# Patient Record
Sex: MALE | Race: BLACK OR AFRICAN AMERICAN | URBAN - METROPOLITAN AREA
[De-identification: names, ages, dates, MRNs, and addresses within clinical notes are randomized per-mention and may not be internally consistent; named-entity substitution may affect disease eponyms.]

---

## 2017-02-06 PROBLEM — K37 APPENDICITIS: Status: ACTIVE | Noted: 2017-02-06

## 2018-01-01 ENCOUNTER — HOSPITAL ENCOUNTER (OUTPATIENT)
Dept: WOUND CARE | Age: 73
Discharge: HOME | End: 2018-08-06
Payer: MEDICARE

## 2018-01-01 ENCOUNTER — HOSPITAL ENCOUNTER (OUTPATIENT)
Dept: WOUND CARE | Age: 73
Discharge: HOME | End: 2018-06-25
Payer: MEDICARE

## 2018-01-01 ENCOUNTER — HOSPITAL ENCOUNTER (OUTPATIENT)
Dept: WOUND CARE | Age: 73
Discharge: HOME | End: 2018-08-13
Payer: MEDICARE

## 2018-01-01 ENCOUNTER — HOSPITAL ENCOUNTER (OUTPATIENT)
Dept: WOUND CARE | Age: 73
Discharge: HOME | End: 2018-10-01
Payer: MEDICARE

## 2018-01-01 ENCOUNTER — HOSPITAL ENCOUNTER (OUTPATIENT)
Dept: WOUND CARE | Age: 73
Discharge: HOME | End: 2018-08-20
Payer: MEDICARE

## 2018-01-01 ENCOUNTER — APPOINTMENT (OUTPATIENT)
Dept: GENERAL RADIOLOGY | Age: 73
DRG: 871 | End: 2018-01-01
Payer: MEDICARE

## 2018-01-01 ENCOUNTER — HOSPITAL ENCOUNTER (OUTPATIENT)
Dept: WOUND CARE | Age: 73
Discharge: HOME | End: 2018-09-24
Payer: MEDICARE

## 2018-01-01 ENCOUNTER — HOSPITAL ENCOUNTER (OUTPATIENT)
Dept: WOUND CARE | Age: 73
Discharge: HOME | End: 2018-09-17
Payer: MEDICARE

## 2018-01-01 ENCOUNTER — HOSPITAL ENCOUNTER (OUTPATIENT)
Age: 73
Discharge: HOME | End: 2018-09-09
Payer: MEDICARE

## 2018-01-01 ENCOUNTER — HOSPITAL ENCOUNTER (OUTPATIENT)
Dept: WOUND CARE | Age: 73
Discharge: HOME | End: 2018-11-05
Payer: MEDICARE

## 2018-01-01 ENCOUNTER — APPOINTMENT (OUTPATIENT)
Dept: ULTRASOUND IMAGING | Age: 73
DRG: 871 | End: 2018-01-01
Payer: MEDICARE

## 2018-01-01 ENCOUNTER — HOSPITAL ENCOUNTER (OUTPATIENT)
Dept: WOUND CARE | Age: 73
Discharge: HOME | End: 2018-09-10
Payer: MEDICARE

## 2018-01-01 ENCOUNTER — HOSPITAL ENCOUNTER (OUTPATIENT)
Dept: WOUND CARE | Age: 73
Discharge: HOME | End: 2018-10-29
Payer: MEDICARE

## 2018-01-01 ENCOUNTER — HOSPITAL ENCOUNTER (OUTPATIENT)
Dept: WOUND CARE | Age: 73
Discharge: HOME | End: 2018-10-22
Payer: MEDICARE

## 2018-01-01 ENCOUNTER — HOSPITAL ENCOUNTER (OUTPATIENT)
Dept: WOUND CARE | Age: 73
Discharge: HOME | End: 2018-08-27
Payer: MEDICARE

## 2018-01-01 ENCOUNTER — HOSPITAL ENCOUNTER (OUTPATIENT)
Dept: WOUND CARE | Age: 73
Discharge: HOME | End: 2018-06-18
Payer: MEDICARE

## 2018-01-01 ENCOUNTER — HOSPITAL ENCOUNTER (OUTPATIENT)
Dept: WOUND CARE | Age: 73
Discharge: HOME | End: 2018-10-15
Payer: MEDICARE

## 2018-01-01 ENCOUNTER — HOSPITAL ENCOUNTER (OUTPATIENT)
Dept: INTERVENTIONAL RADIOLOGY/VASCULAR | Age: 73
Discharge: HOME | End: 2018-06-27
Payer: MEDICARE

## 2018-01-01 ENCOUNTER — HOSPITAL ENCOUNTER (OUTPATIENT)
Dept: WOUND CARE | Age: 73
Discharge: HOME | End: 2018-07-11
Payer: MEDICARE

## 2018-01-01 ENCOUNTER — HOSPITAL ENCOUNTER (OUTPATIENT)
Dept: WOUND CARE | Age: 73
Discharge: HOME | End: 2018-10-08
Payer: MEDICARE

## 2018-01-01 ENCOUNTER — HOSPITAL ENCOUNTER (OUTPATIENT)
Dept: WOUND CARE | Age: 73
Discharge: HOME | End: 2018-07-16
Payer: MEDICARE

## 2018-01-01 ENCOUNTER — HOSPITAL ENCOUNTER (OUTPATIENT)
Age: 73
Discharge: HOME | End: 2018-06-03
Payer: MEDICARE

## 2018-01-01 ENCOUNTER — HOSPITAL ENCOUNTER (OUTPATIENT)
Dept: WOUND CARE | Age: 73
Discharge: HOME | End: 2018-09-04
Payer: MEDICARE

## 2018-01-01 ENCOUNTER — HOSPITAL ENCOUNTER (OUTPATIENT)
Dept: WOUND CARE | Age: 73
Discharge: HOME | End: 2018-06-11
Payer: MEDICARE

## 2018-01-01 ENCOUNTER — HOSPITAL ENCOUNTER (OUTPATIENT)
Dept: WOUND CARE | Age: 73
Discharge: HOME | End: 2018-07-30
Payer: MEDICARE

## 2018-01-01 ENCOUNTER — HOSPITAL ENCOUNTER (OUTPATIENT)
Dept: WOUND CARE | Age: 73
Discharge: HOME | End: 2018-07-23
Payer: MEDICARE

## 2018-01-01 ENCOUNTER — HOSPITAL ENCOUNTER (INPATIENT)
Age: 73
LOS: 1 days | DRG: 871 | End: 2018-11-10
Attending: EMERGENCY MEDICINE | Admitting: INTERNAL MEDICINE
Payer: MEDICARE

## 2018-01-01 VITALS
SYSTOLIC BLOOD PRESSURE: 136 MMHG | HEART RATE: 84 BPM | WEIGHT: 315 LBS | DIASTOLIC BLOOD PRESSURE: 84 MMHG | HEIGHT: 66 IN | BODY MASS INDEX: 50.62 KG/M2 | TEMPERATURE: 99.2 F | RESPIRATION RATE: 18 BRPM

## 2018-01-01 VITALS
RESPIRATION RATE: 16 BRPM | HEART RATE: 68 BPM | TEMPERATURE: 98.3 F | SYSTOLIC BLOOD PRESSURE: 140 MMHG | BODY MASS INDEX: 50.62 KG/M2 | HEIGHT: 66 IN | WEIGHT: 315 LBS | DIASTOLIC BLOOD PRESSURE: 70 MMHG

## 2018-01-01 VITALS
DIASTOLIC BLOOD PRESSURE: 78 MMHG | HEART RATE: 76 BPM | HEIGHT: 66 IN | RESPIRATION RATE: 18 BRPM | TEMPERATURE: 98.4 F | WEIGHT: 286 LBS | SYSTOLIC BLOOD PRESSURE: 152 MMHG | BODY MASS INDEX: 45.96 KG/M2

## 2018-01-01 VITALS
HEIGHT: 66 IN | BODY MASS INDEX: 50.62 KG/M2 | RESPIRATION RATE: 16 BRPM | WEIGHT: 315 LBS | DIASTOLIC BLOOD PRESSURE: 80 MMHG | TEMPERATURE: 98 F | SYSTOLIC BLOOD PRESSURE: 130 MMHG | HEART RATE: 88 BPM

## 2018-01-01 VITALS
RESPIRATION RATE: 16 BRPM | HEART RATE: 64 BPM | WEIGHT: 286 LBS | DIASTOLIC BLOOD PRESSURE: 60 MMHG | SYSTOLIC BLOOD PRESSURE: 134 MMHG | HEIGHT: 66 IN | TEMPERATURE: 98 F | BODY MASS INDEX: 45.96 KG/M2

## 2018-01-01 VITALS
SYSTOLIC BLOOD PRESSURE: 110 MMHG | RESPIRATION RATE: 20 BRPM | HEART RATE: 82 BPM | WEIGHT: 286 LBS | HEIGHT: 66 IN | TEMPERATURE: 98.3 F | BODY MASS INDEX: 45.96 KG/M2 | DIASTOLIC BLOOD PRESSURE: 60 MMHG

## 2018-01-01 VITALS
TEMPERATURE: 98 F | DIASTOLIC BLOOD PRESSURE: 84 MMHG | HEART RATE: 88 BPM | WEIGHT: 286 LBS | SYSTOLIC BLOOD PRESSURE: 136 MMHG | HEIGHT: 66 IN | BODY MASS INDEX: 45.96 KG/M2 | RESPIRATION RATE: 18 BRPM

## 2018-01-01 VITALS
SYSTOLIC BLOOD PRESSURE: 140 MMHG | BODY MASS INDEX: 50.62 KG/M2 | HEIGHT: 66 IN | HEART RATE: 88 BPM | DIASTOLIC BLOOD PRESSURE: 80 MMHG | WEIGHT: 315 LBS | RESPIRATION RATE: 20 BRPM | TEMPERATURE: 98 F

## 2018-01-01 VITALS
HEIGHT: 66 IN | BODY MASS INDEX: 50.62 KG/M2 | DIASTOLIC BLOOD PRESSURE: 80 MMHG | RESPIRATION RATE: 16 BRPM | HEART RATE: 72 BPM | TEMPERATURE: 98.6 F | WEIGHT: 315 LBS | SYSTOLIC BLOOD PRESSURE: 146 MMHG

## 2018-01-01 VITALS
OXYGEN SATURATION: 81 % | DIASTOLIC BLOOD PRESSURE: 79 MMHG | HEART RATE: 56 BPM | BODY MASS INDEX: 48.23 KG/M2 | RESPIRATION RATE: 23 BRPM | SYSTOLIC BLOOD PRESSURE: 104 MMHG | HEIGHT: 66 IN | TEMPERATURE: 99.4 F | WEIGHT: 300.1 LBS

## 2018-01-01 VITALS
BODY MASS INDEX: 45.96 KG/M2 | DIASTOLIC BLOOD PRESSURE: 74 MMHG | TEMPERATURE: 98.8 F | HEART RATE: 74 BPM | SYSTOLIC BLOOD PRESSURE: 128 MMHG | RESPIRATION RATE: 18 BRPM | WEIGHT: 286 LBS | HEIGHT: 66 IN

## 2018-01-01 VITALS
RESPIRATION RATE: 16 BRPM | HEIGHT: 66 IN | DIASTOLIC BLOOD PRESSURE: 70 MMHG | WEIGHT: 315 LBS | BODY MASS INDEX: 50.62 KG/M2 | TEMPERATURE: 97.8 F | HEART RATE: 88 BPM | SYSTOLIC BLOOD PRESSURE: 123 MMHG

## 2018-01-01 VITALS
HEART RATE: 72 BPM | BODY MASS INDEX: 45.96 KG/M2 | HEIGHT: 66 IN | HEIGHT: 66 IN | WEIGHT: 286 LBS | RESPIRATION RATE: 18 BRPM | DIASTOLIC BLOOD PRESSURE: 78 MMHG | TEMPERATURE: 99.4 F | BODY MASS INDEX: 45.96 KG/M2 | TEMPERATURE: 99 F | HEART RATE: 80 BPM | SYSTOLIC BLOOD PRESSURE: 138 MMHG | WEIGHT: 286 LBS | RESPIRATION RATE: 18 BRPM | SYSTOLIC BLOOD PRESSURE: 142 MMHG | DIASTOLIC BLOOD PRESSURE: 70 MMHG

## 2018-01-01 VITALS
RESPIRATION RATE: 18 BRPM | HEART RATE: 76 BPM | BODY MASS INDEX: 45.96 KG/M2 | WEIGHT: 286 LBS | TEMPERATURE: 98.1 F | HEIGHT: 66 IN

## 2018-01-01 VITALS
SYSTOLIC BLOOD PRESSURE: 138 MMHG | BODY MASS INDEX: 45.96 KG/M2 | DIASTOLIC BLOOD PRESSURE: 78 MMHG | HEIGHT: 66 IN | TEMPERATURE: 97.8 F | RESPIRATION RATE: 20 BRPM | HEART RATE: 84 BPM | WEIGHT: 286 LBS

## 2018-01-01 VITALS
BODY MASS INDEX: 50.62 KG/M2 | DIASTOLIC BLOOD PRESSURE: 84 MMHG | SYSTOLIC BLOOD PRESSURE: 142 MMHG | SYSTOLIC BLOOD PRESSURE: 152 MMHG | HEART RATE: 88 BPM | TEMPERATURE: 97.4 F | HEIGHT: 66 IN | HEIGHT: 66 IN | WEIGHT: 315 LBS | TEMPERATURE: 98 F | RESPIRATION RATE: 20 BRPM | RESPIRATION RATE: 18 BRPM | HEART RATE: 76 BPM | DIASTOLIC BLOOD PRESSURE: 78 MMHG | BODY MASS INDEX: 45.96 KG/M2 | WEIGHT: 286 LBS

## 2018-01-01 VITALS
BODY MASS INDEX: 50.62 KG/M2 | DIASTOLIC BLOOD PRESSURE: 50 MMHG | HEIGHT: 66 IN | TEMPERATURE: 98.9 F | WEIGHT: 315 LBS | RESPIRATION RATE: 26 BRPM | SYSTOLIC BLOOD PRESSURE: 100 MMHG | HEART RATE: 76 BPM

## 2018-01-01 VITALS
TEMPERATURE: 98.7 F | RESPIRATION RATE: 20 BRPM | WEIGHT: 286 LBS | BODY MASS INDEX: 45.96 KG/M2 | SYSTOLIC BLOOD PRESSURE: 140 MMHG | HEIGHT: 66 IN | HEART RATE: 80 BPM | DIASTOLIC BLOOD PRESSURE: 70 MMHG

## 2018-01-01 VITALS
RESPIRATION RATE: 18 BRPM | HEIGHT: 66 IN | HEART RATE: 78 BPM | BODY MASS INDEX: 50.62 KG/M2 | TEMPERATURE: 98.9 F | WEIGHT: 315 LBS

## 2018-01-01 VITALS
DIASTOLIC BLOOD PRESSURE: 76 MMHG | SYSTOLIC BLOOD PRESSURE: 146 MMHG | TEMPERATURE: 97.9 F | RESPIRATION RATE: 18 BRPM | HEART RATE: 72 BPM | WEIGHT: 286 LBS | BODY MASS INDEX: 46.16 KG/M2

## 2018-01-01 DIAGNOSIS — L97.222 VENOUS STASIS ULCER OF LEFT CALF WITH FAT LAYER EXPOSED WITHOUT VARICOSE VEINS (HCC): ICD-10-CM

## 2018-01-01 DIAGNOSIS — I87.2 VENOUS STASIS ULCER OF LEFT CALF WITH FAT LAYER EXPOSED WITHOUT VARICOSE VEINS (HCC): ICD-10-CM

## 2018-01-01 DIAGNOSIS — R09.89 DECREASED DORSALIS PEDIS PULSE: ICD-10-CM

## 2018-01-01 DIAGNOSIS — E78.01 FAMILIAL HYPERCHOLESTEROLEMIA: ICD-10-CM

## 2018-01-01 DIAGNOSIS — E11.9 TYPE 2 DIABETES MELLITUS WITHOUT COMPLICATION, WITHOUT LONG-TERM CURRENT USE OF INSULIN (HCC): ICD-10-CM

## 2018-01-01 DIAGNOSIS — L97.222 VENOUS STASIS ULCER OF LEFT CALF WITH FAT LAYER EXPOSED WITHOUT VARICOSE VEINS (HCC): Chronic | ICD-10-CM

## 2018-01-01 DIAGNOSIS — I87.2 VENOUS STASIS ULCER OF LEFT CALF WITH FAT LAYER EXPOSED WITHOUT VARICOSE VEINS (HCC): Chronic | ICD-10-CM

## 2018-01-01 DIAGNOSIS — E87.20 METABOLIC ACIDOSIS: ICD-10-CM

## 2018-01-01 DIAGNOSIS — L97.922 ULCER OF LEFT LOWER EXTREMITY WITH FAT LAYER EXPOSED (HCC): ICD-10-CM

## 2018-01-01 DIAGNOSIS — L97.922 ULCER OF LEFT LOWER LEG, WITH FAT LAYER EXPOSED (HCC): Primary | ICD-10-CM

## 2018-01-01 DIAGNOSIS — J18.9 PNEUMONIA DUE TO ORGANISM: Primary | ICD-10-CM

## 2018-01-01 DIAGNOSIS — L97.922 ULCER OF LEFT LOWER LEG, WITH FAT LAYER EXPOSED (HCC): ICD-10-CM

## 2018-01-01 DIAGNOSIS — R06.03 RESPIRATORY DISTRESS: ICD-10-CM

## 2018-01-01 LAB
AADO2: 120.7 MMHG
AADO2: 187.8 MMHG
ALBUMIN SERPL-MCNC: 2.7 G/DL (ref 3.5–5.2)
ALBUMIN SERPL-MCNC: 3.5 G/DL (ref 3.5–5.2)
ALBUMIN SERPL-MCNC: 3.9 G/DL (ref 3.5–5.2)
ALBUMIN SERPL-MCNC: 3.9 G/DL (ref 3.5–5.2)
ALP BLD-CCNC: 50 U/L (ref 40–129)
ALP BLD-CCNC: 62 U/L (ref 40–129)
ALP BLD-CCNC: 83 U/L (ref 40–129)
ALP BLD-CCNC: 89 U/L (ref 40–129)
ALT SERPL-CCNC: 20 U/L (ref 0–40)
ALT SERPL-CCNC: 70 U/L (ref 0–40)
ALT SERPL-CCNC: 77 U/L (ref 0–40)
ALT SERPL-CCNC: 8 U/L (ref 0–40)
ANAEROBIC CULTURE: ABNORMAL
ANAEROBIC CULTURE: ABNORMAL
ANION GAP SERPL CALCULATED.3IONS-SCNC: 17 MMOL/L (ref 7–16)
ANION GAP SERPL CALCULATED.3IONS-SCNC: 19 MMOL/L (ref 7–16)
ANION GAP SERPL CALCULATED.3IONS-SCNC: 20 MMOL/L (ref 7–16)
ANION GAP SERPL CALCULATED.3IONS-SCNC: 20 MMOL/L (ref 7–16)
ANION GAP SERPL CALCULATED.3IONS-SCNC: 22 MMOL/L (ref 7–16)
ANION GAP SERPL CALCULATED.3IONS-SCNC: 28 MMOL/L (ref 7–16)
ANISOCYTOSIS: ABNORMAL
AST SERPL-CCNC: 137 U/L (ref 0–39)
AST SERPL-CCNC: 14 U/L (ref 0–39)
AST SERPL-CCNC: 241 U/L (ref 0–39)
AST SERPL-CCNC: 26 U/L (ref 0–39)
B.E.: -12.5 MMOL/L (ref -3–3)
B.E.: -17.3 MMOL/L (ref -3–3)
B.E.: -21.4 MMOL/L (ref -3–3)
B.E.: -6.6 MMOL/L (ref -3–3)
B.E.: -9 MMOL/L (ref -3–3)
BASOPHILS ABSOLUTE: 0 E9/L (ref 0–0.2)
BASOPHILS ABSOLUTE: 0.06 E9/L (ref 0–0.2)
BASOPHILS ABSOLUTE: 0.06 E9/L (ref 0–0.2)
BASOPHILS RELATIVE PERCENT: 0.3 % (ref 0–2)
BASOPHILS RELATIVE PERCENT: 1.2 % (ref 0–2)
BASOPHILS RELATIVE PERCENT: 1.3 % (ref 0–2)
BILIRUB SERPL-MCNC: 0.5 MG/DL (ref 0–1.2)
BILIRUB SERPL-MCNC: 0.7 MG/DL (ref 0–1.2)
BILIRUB SERPL-MCNC: 2.2 MG/DL (ref 0–1.2)
BILIRUB SERPL-MCNC: 2.9 MG/DL (ref 0–1.2)
BUN BLDV-MCNC: 19 MG/DL (ref 8–23)
BUN BLDV-MCNC: 20 MG/DL (ref 8–23)
BUN BLDV-MCNC: 57 MG/DL (ref 8–23)
BUN BLDV-MCNC: 65 MG/DL (ref 8–23)
BUN BLDV-MCNC: 70 MG/DL (ref 8–23)
BUN BLDV-MCNC: 73 MG/DL (ref 8–23)
BURR CELLS: ABNORMAL
C-REACTIVE PROTEIN: 0.5 MG/DL (ref 0–0.4)
CALCIUM SERPL-MCNC: 8.6 MG/DL (ref 8.6–10.2)
CALCIUM SERPL-MCNC: 9 MG/DL (ref 8.6–10.2)
CALCIUM SERPL-MCNC: 9.2 MG/DL (ref 8.6–10.2)
CALCIUM SERPL-MCNC: 9.6 MG/DL (ref 8.6–10.2)
CALCIUM SERPL-MCNC: 9.6 MG/DL (ref 8.6–10.2)
CALCIUM SERPL-MCNC: 9.7 MG/DL (ref 8.6–10.2)
CHLORIDE BLD-SCNC: 101 MMOL/L (ref 98–107)
CHLORIDE BLD-SCNC: 102 MMOL/L (ref 98–107)
CHLORIDE BLD-SCNC: 102 MMOL/L (ref 98–107)
CHLORIDE BLD-SCNC: 105 MMOL/L (ref 98–107)
CHLORIDE BLD-SCNC: 105 MMOL/L (ref 98–107)
CHLORIDE BLD-SCNC: 98 MMOL/L (ref 98–107)
CHOLESTEROL, TOTAL: 161 MG/DL (ref 0–199)
CHOLESTEROL, TOTAL: 183 MG/DL (ref 0–199)
CO2: 12 MMOL/L (ref 22–29)
CO2: 14 MMOL/L (ref 22–29)
CO2: 15 MMOL/L (ref 22–29)
CO2: 18 MMOL/L (ref 22–29)
CO2: 21 MMOL/L (ref 22–29)
CO2: 22 MMOL/L (ref 22–29)
COHB: 0.1 % (ref 0–1.5)
COHB: 0.1 % (ref 0–1.5)
COHB: 0.3 % (ref 0–1.5)
COMMENT: ABNORMAL
CREAT SERPL-MCNC: 1.7 MG/DL (ref 0.7–1.2)
CREAT SERPL-MCNC: 1.9 MG/DL (ref 0.7–1.2)
CREAT SERPL-MCNC: 3.3 MG/DL (ref 0.7–1.2)
CREAT SERPL-MCNC: 3.4 MG/DL (ref 0.7–1.2)
CREAT SERPL-MCNC: 3.6 MG/DL (ref 0.7–1.2)
CREAT SERPL-MCNC: 3.6 MG/DL (ref 0.7–1.2)
CREATININE URINE: 138 MG/DL (ref 40–278)
CRITICAL: ABNORMAL
DATE ANALYZED: ABNORMAL
DATE OF COLLECTION: ABNORMAL
EOSINOPHILS ABSOLUTE: 0 E9/L (ref 0.05–0.5)
EOSINOPHILS ABSOLUTE: 0.11 E9/L (ref 0.05–0.5)
EOSINOPHILS ABSOLUTE: 0.16 E9/L (ref 0.05–0.5)
EOSINOPHILS RELATIVE PERCENT: 0 % (ref 0–6)
EOSINOPHILS RELATIVE PERCENT: 2.2 % (ref 0–6)
EOSINOPHILS RELATIVE PERCENT: 3.5 % (ref 0–6)
FILM ARRAY ADENOVIRUS: NORMAL
FILM ARRAY BORDETELLA PERTUSSIS: NORMAL
FILM ARRAY CHLAMYDOPHILIA PNEUMONIAE: NORMAL
FILM ARRAY CORONAVIRUS 229E: NORMAL
FILM ARRAY CORONAVIRUS HKU1: NORMAL
FILM ARRAY CORONAVIRUS NL63: NORMAL
FILM ARRAY CORONAVIRUS OC43: NORMAL
FILM ARRAY INFLUENZA A VIRUS 09H1: NORMAL
FILM ARRAY INFLUENZA A VIRUS H1: NORMAL
FILM ARRAY INFLUENZA A VIRUS H3: NORMAL
FILM ARRAY INFLUENZA A VIRUS: NORMAL
FILM ARRAY INFLUENZA B: NORMAL
FILM ARRAY METAPNEUMOVIRUS: NORMAL
FILM ARRAY MYCOPLASMA PNEUMONIAE: NORMAL
FILM ARRAY PARAINFLUENZA VIRUS 1: NORMAL
FILM ARRAY PARAINFLUENZA VIRUS 2: NORMAL
FILM ARRAY PARAINFLUENZA VIRUS 3: NORMAL
FILM ARRAY PARAINFLUENZA VIRUS 4: NORMAL
FILM ARRAY RESPIRATORY SYNCITIAL VIRUS: NORMAL
FILM ARRAY RHINOVIRUS/ENTEROVIRUS: NORMAL
FIO2: 100 %
FIO2: 50 %
GFR AFRICAN AMERICAN: 20
GFR AFRICAN AMERICAN: 20
GFR AFRICAN AMERICAN: 22
GFR AFRICAN AMERICAN: 22
GFR AFRICAN AMERICAN: 42
GFR AFRICAN AMERICAN: 48
GFR NON-AFRICAN AMERICAN: 20 ML/MIN/1.73
GFR NON-AFRICAN AMERICAN: 20 ML/MIN/1.73
GFR NON-AFRICAN AMERICAN: 22 ML/MIN/1.73
GFR NON-AFRICAN AMERICAN: 22 ML/MIN/1.73
GFR NON-AFRICAN AMERICAN: 42 ML/MIN/1.73
GFR NON-AFRICAN AMERICAN: 48 ML/MIN/1.73
GLUCOSE BLD-MCNC: 112 MG/DL (ref 74–109)
GLUCOSE BLD-MCNC: 147 MG/DL (ref 74–99)
GLUCOSE BLD-MCNC: 151 MG/DL (ref 74–99)
GLUCOSE BLD-MCNC: 152 MG/DL (ref 74–99)
GLUCOSE BLD-MCNC: 54 MG/DL (ref 74–99)
GLUCOSE BLD-MCNC: 92 MG/DL (ref 74–109)
HBA1C MFR BLD: 5.8 % (ref 4–5.6)
HBA1C MFR BLD: 6 % (ref 4.8–5.9)
HCO3: 10.7 MMOL/L (ref 22–26)
HCO3: 13.7 MMOL/L (ref 22–26)
HCO3: 14.2 MMOL/L (ref 22–26)
HCO3: 17.6 MMOL/L (ref 22–26)
HCO3: 8.3 MMOL/L (ref 22–26)
HCT VFR BLD CALC: 35.3 % (ref 37–54)
HCT VFR BLD CALC: 40.8 % (ref 37–54)
HCT VFR BLD CALC: 41 % (ref 37–54)
HCT VFR BLD CALC: 43 % (ref 37–54)
HDLC SERPL-MCNC: 47 MG/DL
HDLC SERPL-MCNC: 51 MG/DL
HEMOGLOBIN: 11.1 G/DL (ref 12.5–16.5)
HEMOGLOBIN: 12.3 G/DL (ref 12.5–16.5)
HEMOGLOBIN: 12.4 G/DL (ref 12.5–16.5)
HEMOGLOBIN: 13.5 G/DL (ref 12.5–16.5)
HHB: 0.8 % (ref 0–5)
HHB: 0.9 % (ref 0–5)
HHB: 1.5 % (ref 0–5)
HHB: 6.4 % (ref 0–5)
HHB: 72.2 % (ref 0–5)
IMMATURE GRANULOCYTES #: 0.01 E9/L
IMMATURE GRANULOCYTES #: 0.02 E9/L
IMMATURE GRANULOCYTES %: 0.2 % (ref 0–5)
IMMATURE GRANULOCYTES %: 0.4 % (ref 0–5)
INR BLD: 6.7
INR BLD: 9.6
L. PNEUMOPHILA SEROGP 1 UR AG: NORMAL
LAB: 9558
LAB: ABNORMAL
LAB: ABNORMAL
LACTIC ACID: 2.7 MMOL/L (ref 0.5–2.2)
LACTIC ACID: 3 MMOL/L (ref 0.5–2.2)
LACTIC ACID: 3.5 MMOL/L (ref 0.5–2.2)
LACTIC ACID: 3.7 MMOL/L (ref 0.5–2.2)
LACTIC ACID: 3.9 MMOL/L (ref 0.5–2.2)
LACTIC ACID: 5.4 MMOL/L (ref 0.5–2.2)
LDL CHOLESTEROL CALCULATED: 112 MG/DL (ref 0–99)
LDL CHOLESTEROL CALCULATED: 98 MG/DL (ref 0–99)
LYMPHOCYTES ABSOLUTE: 0.36 E9/L (ref 1.5–4)
LYMPHOCYTES ABSOLUTE: 1.02 E9/L (ref 1.5–4)
LYMPHOCYTES ABSOLUTE: 1.18 E9/L (ref 1.5–4)
LYMPHOCYTES RELATIVE PERCENT: 1.7 % (ref 20–42)
LYMPHOCYTES RELATIVE PERCENT: 20 % (ref 20–42)
LYMPHOCYTES RELATIVE PERCENT: 25.8 % (ref 20–42)
Lab: ABNORMAL
MCH RBC QN AUTO: 29.6 PG (ref 26–35)
MCH RBC QN AUTO: 29.7 PG (ref 26–35)
MCH RBC QN AUTO: 30.8 PG (ref 26–35)
MCH RBC QN AUTO: 31.1 PG (ref 26–35)
MCHC RBC AUTO-ENTMCNC: 30.1 % (ref 32–34.5)
MCHC RBC AUTO-ENTMCNC: 30.2 % (ref 32–34.5)
MCHC RBC AUTO-ENTMCNC: 31.4 % (ref 32–34.5)
MCHC RBC AUTO-ENTMCNC: 31.4 % (ref 32–34.5)
MCV RBC AUTO: 102.8 FL (ref 80–99.9)
MCV RBC AUTO: 94.1 FL (ref 80–99.9)
MCV RBC AUTO: 97.9 FL (ref 80–99.9)
MCV RBC AUTO: 98.6 FL (ref 80–99.9)
METER GLUCOSE: 115 MG/DL (ref 74–99)
METER GLUCOSE: 116 MG/DL (ref 74–99)
METER GLUCOSE: 123 MG/DL (ref 74–99)
METER GLUCOSE: 127 MG/DL (ref 74–99)
METER GLUCOSE: 133 MG/DL (ref 74–99)
METER GLUCOSE: 154 MG/DL (ref 74–99)
METHB: 0.2 % (ref 0–1.5)
METHB: 0.4 % (ref 0–1.5)
METHB: 0.4 % (ref 0–1.5)
METHB: 1.1 % (ref 0–1.5)
METHB: 1.4 % (ref 0–1.5)
MODE: ABNORMAL
MONOCYTES ABSOLUTE: 0.47 E9/L (ref 0.1–0.95)
MONOCYTES ABSOLUTE: 0.51 E9/L (ref 0.1–0.95)
MONOCYTES ABSOLUTE: 0.53 E9/L (ref 0.1–0.95)
MONOCYTES RELATIVE PERCENT: 10 % (ref 2–12)
MONOCYTES RELATIVE PERCENT: 10.3 % (ref 2–12)
MONOCYTES RELATIVE PERCENT: 2.6 % (ref 2–12)
NEUTROPHILS ABSOLUTE: 17.09 E9/L (ref 1.8–7.3)
NEUTROPHILS ABSOLUTE: 2.69 E9/L (ref 1.8–7.3)
NEUTROPHILS ABSOLUTE: 3.39 E9/L (ref 1.8–7.3)
NEUTROPHILS RELATIVE PERCENT: 58.7 % (ref 43–80)
NEUTROPHILS RELATIVE PERCENT: 66.4 % (ref 43–80)
NEUTROPHILS RELATIVE PERCENT: 95.7 % (ref 43–80)
NUCLEATED RED BLOOD CELLS: 1.7 /100 WBC
O2 CONTENT: 16.7 ML/DL
O2 CONTENT: 16.7 ML/DL
O2 CONTENT: 18 ML/DL
O2 CONTENT: 19.4 ML/DL
O2 CONTENT: 4.7 ML/DL
O2 SATURATION: 26.8 % (ref 92–98.5)
O2 SATURATION: 93.6 % (ref 92–98.5)
O2 SATURATION: 98.5 % (ref 92–98.5)
O2 SATURATION: 99.1 % (ref 92–98.5)
O2 SATURATION: 99.2 % (ref 92–98.5)
O2HB: 26.4 % (ref 94–97)
O2HB: 92.9 % (ref 94–97)
O2HB: 97.5 % (ref 94–97)
O2HB: 98.2 % (ref 94–97)
O2HB: 98.6 % (ref 94–97)
OPERATOR ID: 2860
OPERATOR ID: 467
OPERATOR ID: ABNORMAL
ORGANISM: ABNORMAL
OVALOCYTES: ABNORMAL
PATIENT TEMP: 37 C
PCO2: 19.6 MMHG (ref 35–45)
PCO2: 24.1 MMHG (ref 35–45)
PCO2: 31.1 MMHG (ref 35–45)
PCO2: 32.1 MMHG (ref 35–45)
PCO2: 54.8 MMHG (ref 35–45)
PDW BLD-RTO: 15 FL (ref 11.5–15)
PDW BLD-RTO: 16.4 FL (ref 11.5–15)
PDW BLD-RTO: 16.7 FL (ref 11.5–15)
PDW BLD-RTO: 17 FL (ref 11.5–15)
PEEP/CPAP: 6 CMH2O
PFO2: 3.92 MMHG/%
PFO2: 4.81 MMHG/%
PH BLOOD GAS: 7.01 (ref 7.35–7.45)
PH BLOOD GAS: 7.03 (ref 7.35–7.45)
PH BLOOD GAS: 7.35 (ref 7.35–7.45)
PH BLOOD GAS: 7.37 (ref 7.35–7.45)
PH BLOOD GAS: 7.39 (ref 7.35–7.45)
PLATELET # BLD: 122 E9/L (ref 130–450)
PLATELET # BLD: 149 E9/L (ref 130–450)
PLATELET # BLD: 167 E9/L (ref 130–450)
PLATELET # BLD: 185 E9/L (ref 130–450)
PMV BLD AUTO: 10.9 FL (ref 7–12)
PMV BLD AUTO: 11.3 FL (ref 7–12)
PMV BLD AUTO: 12.2 FL (ref 7–12)
PMV BLD AUTO: 12.5 FL (ref 7–12)
PO2: 100.1 MMHG (ref 60–100)
PO2: 133.2 MMHG (ref 60–100)
PO2: 196.2 MMHG (ref 60–100)
PO2: 28 MMHG (ref 60–100)
PO2: 480.6 MMHG (ref 60–100)
POIKILOCYTES: ABNORMAL
POTASSIUM SERPL-SCNC: 4.4 MMOL/L (ref 3.5–5)
POTASSIUM SERPL-SCNC: 4.5 MMOL/L (ref 3.5–5)
POTASSIUM SERPL-SCNC: 4.6 MMOL/L (ref 3.5–5)
POTASSIUM SERPL-SCNC: 4.7 MMOL/L (ref 3.5–5)
POTASSIUM SERPL-SCNC: 4.7 MMOL/L (ref 3.5–5)
POTASSIUM SERPL-SCNC: 4.93 MMOL/L (ref 3.3–5.1)
POTASSIUM SERPL-SCNC: 5.4 MMOL/L (ref 3.5–5)
POTASSIUM SERPL-SCNC: 6.35 MMOL/L (ref 3.3–5.1)
POTASSIUM SERPL-SCNC: 6.54 MMOL/L (ref 3.3–5.1)
PRO-BNP: ABNORMAL PG/ML (ref 0–450)
PROCALCITONIN: 27.39 NG/ML (ref 0–0.08)
PROTHROMBIN TIME: 107 SEC (ref 9.3–12.4)
PROTHROMBIN TIME: 74.8 SEC (ref 9.3–12.4)
RBC # BLD: 3.75 E12/L (ref 3.8–5.8)
RBC # BLD: 3.99 E12/L (ref 3.8–5.8)
RBC # BLD: 4.14 E12/L (ref 3.8–5.8)
RBC # BLD: 4.39 E12/L (ref 3.8–5.8)
RI(T): 39 %
RI(T): 62 %
RR MECHANICAL: 14 B/MIN
SCHISTOCYTES: ABNORMAL
SEDIMENTATION RATE, ERYTHROCYTE: 64 MM/HR (ref 0–15)
SODIUM BLD-SCNC: 138 MMOL/L (ref 132–146)
SODIUM BLD-SCNC: 139 MMOL/L (ref 132–146)
SODIUM BLD-SCNC: 140 MMOL/L (ref 132–146)
SODIUM BLD-SCNC: 141 MMOL/L (ref 132–146)
SODIUM BLD-SCNC: 141 MMOL/L (ref 132–146)
SODIUM BLD-SCNC: 142 MMOL/L (ref 132–146)
SODIUM URINE: 23 MMOL/L
SOURCE, BLOOD GAS: ABNORMAL
STREP PNEUMONIAE ANTIGEN, URINE: NORMAL
THB: 11.9 G/DL (ref 11.5–16.5)
THB: 12.6 G/DL (ref 11.5–16.5)
THB: 12.7 G/DL (ref 11.5–16.5)
THB: 12.7 G/DL (ref 11.5–16.5)
THB: 13.2 G/DL (ref 11.5–16.5)
TIME ANALYZED: 1801
TIME ANALYZED: 1803
TIME ANALYZED: 407
TIME ANALYZED: 5
TIME ANALYZED: 852
TOTAL PROTEIN: 5.9 G/DL (ref 6.4–8.3)
TOTAL PROTEIN: 6.8 G/DL (ref 6.4–8.3)
TOTAL PROTEIN: 7.1 G/DL (ref 6.4–8.3)
TOTAL PROTEIN: 7.1 G/DL (ref 6.4–8.3)
TRIGL SERPL-MCNC: 100 MG/DL (ref 0–149)
TRIGL SERPL-MCNC: 81 MG/DL (ref 0–149)
TROPONIN: 0.08 NG/ML (ref 0–0.03)
VLDLC SERPL CALC-MCNC: 16 MG/DL
VLDLC SERPL CALC-MCNC: 20 MG/DL
VT MECHANICAL: 500 ML
WBC # BLD: 17.8 E9/L (ref 4.5–11.5)
WBC # BLD: 18.7 E9/L (ref 4.5–11.5)
WBC # BLD: 4.6 E9/L (ref 4.5–11.5)
WBC # BLD: 5.1 E9/L (ref 4.5–11.5)
WOUND/ABSCESS: ABNORMAL

## 2018-01-01 PROCEDURE — 2580000003 HC RX 258

## 2018-01-01 PROCEDURE — 80048 BASIC METABOLIC PNL TOTAL CA: CPT

## 2018-01-01 PROCEDURE — 11045 DBRDMT SUBQ TISS EACH ADDL: CPT

## 2018-01-01 PROCEDURE — 94761 N-INVAS EAR/PLS OXIMETRY MLT: CPT

## 2018-01-01 PROCEDURE — 87186 SC STD MICRODIL/AGAR DIL: CPT

## 2018-01-01 PROCEDURE — 83036 HEMOGLOBIN GLYCOSYLATED A1C: CPT

## 2018-01-01 PROCEDURE — 80053 COMPREHEN METABOLIC PANEL: CPT

## 2018-01-01 PROCEDURE — 99223 1ST HOSP IP/OBS HIGH 75: CPT | Performed by: INTERNAL MEDICINE

## 2018-01-01 PROCEDURE — 6370000000 HC RX 637 (ALT 250 FOR IP): Performed by: INTERNAL MEDICINE

## 2018-01-01 PROCEDURE — 80061 LIPID PANEL: CPT

## 2018-01-01 PROCEDURE — 29581 APPL MULTLAYER CMPRN SYS LEG: CPT

## 2018-01-01 PROCEDURE — 11042 DBRDMT SUBQ TIS 1ST 20SQCM/<: CPT

## 2018-01-01 PROCEDURE — 85610 PROTHROMBIN TIME: CPT

## 2018-01-01 PROCEDURE — 87450 HC DIRECT STREP B ANTIGEN: CPT

## 2018-01-01 PROCEDURE — 71045 X-RAY EXAM CHEST 1 VIEW: CPT

## 2018-01-01 PROCEDURE — 0BH17EZ INSERTION OF ENDOTRACHEAL AIRWAY INTO TRACHEA, VIA NATURAL OR ARTIFICIAL OPENING: ICD-10-PCS | Performed by: INTERNAL MEDICINE

## 2018-01-01 PROCEDURE — 99285 EMERGENCY DEPT VISIT HI MDM: CPT

## 2018-01-01 PROCEDURE — 82805 BLOOD GASES W/O2 SATURATION: CPT

## 2018-01-01 PROCEDURE — 6360000002 HC RX W HCPCS: Performed by: EMERGENCY MEDICINE

## 2018-01-01 PROCEDURE — 94002 VENT MGMT INPAT INIT DAY: CPT

## 2018-01-01 PROCEDURE — 2580000003 HC RX 258: Performed by: INTERNAL MEDICINE

## 2018-01-01 PROCEDURE — 92950 HEART/LUNG RESUSCITATION CPR: CPT

## 2018-01-01 PROCEDURE — 99213 OFFICE O/P EST LOW 20 MIN: CPT

## 2018-01-01 PROCEDURE — 82962 GLUCOSE BLOOD TEST: CPT

## 2018-01-01 PROCEDURE — 87070 CULTURE OTHR SPECIMN AEROBIC: CPT

## 2018-01-01 PROCEDURE — 83605 ASSAY OF LACTIC ACID: CPT

## 2018-01-01 PROCEDURE — 87205 SMEAR GRAM STAIN: CPT

## 2018-01-01 PROCEDURE — 85025 COMPLETE CBC W/AUTO DIFF WBC: CPT

## 2018-01-01 PROCEDURE — 98960 EDU&TRN PT SELF-MGMT NQHP 1: CPT

## 2018-01-01 PROCEDURE — 94640 AIRWAY INHALATION TREATMENT: CPT

## 2018-01-01 PROCEDURE — 85651 RBC SED RATE NONAUTOMATED: CPT

## 2018-01-01 PROCEDURE — 2700000000 HC OXYGEN THERAPY PER DAY

## 2018-01-01 PROCEDURE — 84145 PROCALCITONIN (PCT): CPT

## 2018-01-01 PROCEDURE — 84484 ASSAY OF TROPONIN QUANT: CPT

## 2018-01-01 PROCEDURE — 87040 BLOOD CULTURE FOR BACTERIA: CPT

## 2018-01-01 PROCEDURE — 87486 CHLMYD PNEUM DNA AMP PROBE: CPT

## 2018-01-01 PROCEDURE — 99291 CRITICAL CARE FIRST HOUR: CPT | Performed by: INTERNAL MEDICINE

## 2018-01-01 PROCEDURE — 6370000000 HC RX 637 (ALT 250 FOR IP): Performed by: EMERGENCY MEDICINE

## 2018-01-01 PROCEDURE — 2580000003 HC RX 258: Performed by: EMERGENCY MEDICINE

## 2018-01-01 PROCEDURE — 86140 C-REACTIVE PROTEIN: CPT

## 2018-01-01 PROCEDURE — 93923 UPR/LXTR ART STDY 3+ LVLS: CPT

## 2018-01-01 PROCEDURE — 93005 ELECTROCARDIOGRAM TRACING: CPT | Performed by: INTERNAL MEDICINE

## 2018-01-01 PROCEDURE — 96365 THER/PROPH/DIAG IV INF INIT: CPT

## 2018-01-01 PROCEDURE — 84300 ASSAY OF URINE SODIUM: CPT

## 2018-01-01 PROCEDURE — 85027 COMPLETE CBC AUTOMATED: CPT

## 2018-01-01 PROCEDURE — 87633 RESP VIRUS 12-25 TARGETS: CPT

## 2018-01-01 PROCEDURE — 87798 DETECT AGENT NOS DNA AMP: CPT

## 2018-01-01 PROCEDURE — 6360000002 HC RX W HCPCS

## 2018-01-01 PROCEDURE — 2000000000 HC ICU R&B

## 2018-01-01 PROCEDURE — 31500 INSERT EMERGENCY AIRWAY: CPT

## 2018-01-01 PROCEDURE — 36415 COLL VENOUS BLD VENIPUNCTURE: CPT

## 2018-01-01 PROCEDURE — 2500000003 HC RX 250 WO HCPCS

## 2018-01-01 PROCEDURE — 6370000000 HC RX 637 (ALT 250 FOR IP)

## 2018-01-01 PROCEDURE — 83880 ASSAY OF NATRIURETIC PEPTIDE: CPT

## 2018-01-01 PROCEDURE — 84132 ASSAY OF SERUM POTASSIUM: CPT

## 2018-01-01 PROCEDURE — 94660 CPAP INITIATION&MGMT: CPT

## 2018-01-01 PROCEDURE — 87581 M.PNEUMON DNA AMP PROBE: CPT

## 2018-01-01 PROCEDURE — 51702 INSERT TEMP BLADDER CATH: CPT

## 2018-01-01 PROCEDURE — 6360000002 HC RX W HCPCS: Performed by: INTERNAL MEDICINE

## 2018-01-01 PROCEDURE — 96375 TX/PRO/DX INJ NEW DRUG ADDON: CPT

## 2018-01-01 PROCEDURE — 76770 US EXAM ABDO BACK WALL COMP: CPT

## 2018-01-01 PROCEDURE — 87075 CULTR BACTERIA EXCEPT BLOOD: CPT

## 2018-01-01 PROCEDURE — 82570 ASSAY OF URINE CREATININE: CPT

## 2018-01-01 PROCEDURE — 5A1935Z RESPIRATORY VENTILATION, LESS THAN 24 CONSECUTIVE HOURS: ICD-10-PCS | Performed by: INTERNAL MEDICINE

## 2018-01-01 RX ORDER — AZITHROMYCIN 250 MG/1
250 TABLET, FILM COATED ORAL DAILY
Status: DISCONTINUED | OUTPATIENT
Start: 2018-01-01 | End: 2018-01-01

## 2018-01-01 RX ORDER — CALCIUM CHLORIDE 100 MG/ML
1 INJECTION INTRAVENOUS; INTRAVENTRICULAR ONCE
Status: DISCONTINUED | OUTPATIENT
Start: 2018-01-01 | End: 2018-01-01 | Stop reason: HOSPADM

## 2018-01-01 RX ORDER — 0.9 % SODIUM CHLORIDE 0.9 %
500 INTRAVENOUS SOLUTION INTRAVENOUS ONCE
Status: COMPLETED | OUTPATIENT
Start: 2018-01-01 | End: 2018-01-01

## 2018-01-01 RX ORDER — SODIUM CHLORIDE 0.9 % (FLUSH) 0.9 %
10 SYRINGE (ML) INJECTION EVERY 12 HOURS SCHEDULED
Status: DISCONTINUED | OUTPATIENT
Start: 2018-01-01 | End: 2018-01-01 | Stop reason: HOSPADM

## 2018-01-01 RX ORDER — DEXTROSE MONOHYDRATE 25 G/50ML
12.5 INJECTION, SOLUTION INTRAVENOUS PRN
Status: DISCONTINUED | OUTPATIENT
Start: 2018-01-01 | End: 2018-01-01 | Stop reason: HOSPADM

## 2018-01-01 RX ORDER — SODIUM CHLORIDE 9 MG/ML
INJECTION, SOLUTION INTRAVENOUS CONTINUOUS
Status: DISCONTINUED | OUTPATIENT
Start: 2018-01-01 | End: 2018-01-01 | Stop reason: HOSPADM

## 2018-01-01 RX ORDER — ACETAMINOPHEN 650 MG/1
SUPPOSITORY RECTAL
Status: DISPENSED
Start: 2018-01-01 | End: 2018-01-01

## 2018-01-01 RX ORDER — NICOTINE POLACRILEX 4 MG
15 LOZENGE BUCCAL PRN
Status: DISCONTINUED | OUTPATIENT
Start: 2018-01-01 | End: 2018-01-01 | Stop reason: HOSPADM

## 2018-01-01 RX ORDER — AZITHROMYCIN 250 MG/1
500 TABLET, FILM COATED ORAL ONCE
Status: COMPLETED | OUTPATIENT
Start: 2018-01-01 | End: 2018-01-01

## 2018-01-01 RX ORDER — SULFAMETHOXAZOLE AND TRIMETHOPRIM 800; 160 MG/1; MG/1
1 TABLET ORAL 2 TIMES DAILY
Qty: 20 TABLET | Refills: 0 | Status: SHIPPED | OUTPATIENT
Start: 2018-01-01 | End: 2018-01-01

## 2018-01-01 RX ORDER — ONDANSETRON 2 MG/ML
4 INJECTION INTRAMUSCULAR; INTRAVENOUS EVERY 6 HOURS PRN
Status: DISCONTINUED | OUTPATIENT
Start: 2018-01-01 | End: 2018-01-01 | Stop reason: HOSPADM

## 2018-01-01 RX ORDER — METHYLPREDNISOLONE SODIUM SUCCINATE 125 MG/2ML
125 INJECTION, POWDER, LYOPHILIZED, FOR SOLUTION INTRAMUSCULAR; INTRAVENOUS ONCE
Status: DISCONTINUED | OUTPATIENT
Start: 2018-01-01 | End: 2018-01-01

## 2018-01-01 RX ORDER — DEXTROSE MONOHYDRATE 25 G/50ML
25 INJECTION, SOLUTION INTRAVENOUS ONCE
Status: COMPLETED | OUTPATIENT
Start: 2018-01-01 | End: 2018-01-01

## 2018-01-01 RX ORDER — ACETAMINOPHEN 650 MG/1
SUPPOSITORY RECTAL
Status: COMPLETED
Start: 2018-01-01 | End: 2018-01-01

## 2018-01-01 RX ORDER — IPRATROPIUM BROMIDE AND ALBUTEROL SULFATE 2.5; .5 MG/3ML; MG/3ML
1 SOLUTION RESPIRATORY (INHALATION) ONCE
Status: COMPLETED | OUTPATIENT
Start: 2018-01-01 | End: 2018-01-01

## 2018-01-01 RX ORDER — ACETAMINOPHEN 650 MG/1
650 SUPPOSITORY RECTAL ONCE
Status: COMPLETED | OUTPATIENT
Start: 2018-01-01 | End: 2018-01-01

## 2018-01-01 RX ORDER — 0.9 % SODIUM CHLORIDE 0.9 %
1000 INTRAVENOUS SOLUTION INTRAVENOUS ONCE
Status: COMPLETED | OUTPATIENT
Start: 2018-01-01 | End: 2018-01-01

## 2018-01-01 RX ORDER — ACETAMINOPHEN 500 MG
1000 TABLET ORAL ONCE
Status: DISCONTINUED | OUTPATIENT
Start: 2018-01-01 | End: 2018-01-01 | Stop reason: HOSPADM

## 2018-01-01 RX ORDER — DEXTROSE MONOHYDRATE 50 MG/ML
100 INJECTION, SOLUTION INTRAVENOUS PRN
Status: DISCONTINUED | OUTPATIENT
Start: 2018-01-01 | End: 2018-01-01 | Stop reason: HOSPADM

## 2018-01-01 RX ORDER — IPRATROPIUM BROMIDE AND ALBUTEROL SULFATE 2.5; .5 MG/3ML; MG/3ML
1 SOLUTION RESPIRATORY (INHALATION)
Status: DISCONTINUED | OUTPATIENT
Start: 2018-01-01 | End: 2018-01-01 | Stop reason: HOSPADM

## 2018-01-01 RX ORDER — DOXYCYCLINE HYCLATE 100 MG/1
100 CAPSULE ORAL EVERY 12 HOURS SCHEDULED
Status: DISCONTINUED | OUTPATIENT
Start: 2018-01-01 | End: 2018-01-01 | Stop reason: HOSPADM

## 2018-01-01 RX ORDER — FUROSEMIDE 10 MG/ML
40 INJECTION INTRAMUSCULAR; INTRAVENOUS ONCE
Status: COMPLETED | OUTPATIENT
Start: 2018-01-01 | End: 2018-01-01

## 2018-01-01 RX ORDER — NYSTATIN 100000 U/G
CREAM TOPICAL PRN
Status: DISCONTINUED | OUTPATIENT
Start: 2018-01-01 | End: 2018-01-01 | Stop reason: HOSPADM

## 2018-01-01 RX ORDER — GUAIFENESIN 400 MG/1
400 TABLET ORAL 4 TIMES DAILY PRN
Status: DISCONTINUED | OUTPATIENT
Start: 2018-01-01 | End: 2018-01-01 | Stop reason: HOSPADM

## 2018-01-01 RX ORDER — SODIUM CHLORIDE 0.9 % (FLUSH) 0.9 %
10 SYRINGE (ML) INJECTION PRN
Status: DISCONTINUED | OUTPATIENT
Start: 2018-01-01 | End: 2018-01-01 | Stop reason: HOSPADM

## 2018-01-01 RX ORDER — PHYTONADIONE 5 MG/1
5 TABLET ORAL ONCE
Status: DISCONTINUED | OUTPATIENT
Start: 2018-01-01 | End: 2018-01-01

## 2018-01-01 RX ADMIN — FUROSEMIDE 40 MG: 10 INJECTION, SOLUTION INTRAMUSCULAR; INTRAVENOUS at 05:17

## 2018-01-01 RX ADMIN — INSULIN LISPRO 1 UNITS: 100 INJECTION, SOLUTION INTRAVENOUS; SUBCUTANEOUS at 20:00

## 2018-01-01 RX ADMIN — SODIUM CHLORIDE 500 ML: 9 INJECTION, SOLUTION INTRAVENOUS at 09:05

## 2018-01-01 RX ADMIN — NYSTATIN: 100000 CREAM TOPICAL at 02:31

## 2018-01-01 RX ADMIN — CEFTRIAXONE SODIUM 1 G: 1 INJECTION, POWDER, FOR SOLUTION INTRAMUSCULAR; INTRAVENOUS at 06:17

## 2018-01-01 RX ADMIN — SODIUM CHLORIDE 500 ML: 9 INJECTION, SOLUTION INTRAVENOUS at 00:03

## 2018-01-01 RX ADMIN — DOXYCYCLINE 100 MG: 100 CAPSULE ORAL at 15:35

## 2018-01-01 RX ADMIN — AZITHROMYCIN 500 MG: 250 TABLET, FILM COATED ORAL at 06:13

## 2018-01-01 RX ADMIN — SODIUM CHLORIDE 1000 ML: 9 INJECTION, SOLUTION INTRAVENOUS at 17:18

## 2018-01-01 RX ADMIN — IPRATROPIUM BROMIDE AND ALBUTEROL SULFATE 1 AMPULE: .5; 3 SOLUTION RESPIRATORY (INHALATION) at 12:14

## 2018-01-01 RX ADMIN — IPRATROPIUM BROMIDE AND ALBUTEROL SULFATE 1 AMPULE: .5; 3 SOLUTION RESPIRATORY (INHALATION) at 15:50

## 2018-01-01 RX ADMIN — PHYTONADIONE 5 MG: 10 INJECTION, EMULSION INTRAMUSCULAR; INTRAVENOUS; SUBCUTANEOUS at 10:35

## 2018-01-01 RX ADMIN — IPRATROPIUM BROMIDE AND ALBUTEROL SULFATE 1 AMPULE: .5; 3 SOLUTION RESPIRATORY (INHALATION) at 19:27

## 2018-01-01 RX ADMIN — ACETAMINOPHEN 650 MG: 650 SUPPOSITORY RECTAL at 09:00

## 2018-01-01 RX ADMIN — CEFTRIAXONE SODIUM 1 G: 1 INJECTION, POWDER, FOR SOLUTION INTRAMUSCULAR; INTRAVENOUS at 05:42

## 2018-01-01 RX ADMIN — IPRATROPIUM BROMIDE AND ALBUTEROL SULFATE 1 AMPULE: .5; 3 SOLUTION RESPIRATORY (INHALATION) at 04:38

## 2018-01-01 RX ADMIN — CHLORASEPTIC 1 SPRAY: 1.5 LIQUID ORAL at 05:16

## 2018-01-01 RX ADMIN — IPRATROPIUM BROMIDE AND ALBUTEROL SULFATE 1 AMPULE: .5; 3 SOLUTION RESPIRATORY (INHALATION) at 07:51

## 2018-01-01 RX ADMIN — IPRATROPIUM BROMIDE AND ALBUTEROL SULFATE 1 AMPULE: .5; 3 SOLUTION RESPIRATORY (INHALATION) at 11:34

## 2018-01-01 RX ADMIN — Medication 10 ML: at 19:59

## 2018-01-01 RX ADMIN — GUAIFENESIN 400 MG: 400 TABLET ORAL at 05:00

## 2018-01-01 RX ADMIN — IPRATROPIUM BROMIDE AND ALBUTEROL SULFATE 1 AMPULE: .5; 3 SOLUTION RESPIRATORY (INHALATION) at 03:33

## 2018-01-01 RX ADMIN — IPRATROPIUM BROMIDE AND ALBUTEROL SULFATE 1 AMPULE: .5; 3 SOLUTION RESPIRATORY (INHALATION) at 16:38

## 2018-01-01 RX ADMIN — DEXTROSE MONOHYDRATE 25 G: 25 INJECTION, SOLUTION INTRAVENOUS at 08:53

## 2018-01-01 ASSESSMENT — PAIN SCALES - GENERAL
PAINLEVEL_OUTOF10: 0
PAINLEVEL_OUTOF10: 6
PAINLEVEL_OUTOF10: 0
PAINLEVEL_OUTOF10: 0
PAINLEVEL_OUTOF10: 8

## 2018-01-01 ASSESSMENT — PAIN DESCRIPTION - FREQUENCY: FREQUENCY: CONTINUOUS

## 2018-01-01 ASSESSMENT — PAIN DESCRIPTION - PROGRESSION
CLINICAL_PROGRESSION: NOT CHANGED

## 2018-01-01 ASSESSMENT — PAIN DESCRIPTION - ORIENTATION
ORIENTATION: LEFT
ORIENTATION: LEFT

## 2018-01-01 ASSESSMENT — PAIN DESCRIPTION - ONSET
ONSET: ON-GOING
ONSET: ON-GOING

## 2018-01-01 ASSESSMENT — PAIN DESCRIPTION - DESCRIPTORS
DESCRIPTORS: BURNING
DESCRIPTORS: CRAMPING;ACHING

## 2018-01-01 ASSESSMENT — PAIN DESCRIPTION - PAIN TYPE
TYPE: CHRONIC PAIN
TYPE: CHRONIC PAIN
TYPE: ACUTE PAIN
TYPE: CHRONIC PAIN

## 2018-01-01 ASSESSMENT — PAIN DESCRIPTION - LOCATION
LOCATION: LEG
LOCATION: LEG
LOCATION: KNEE

## 2018-01-01 ASSESSMENT — PULMONARY FUNCTION TESTS: PIF_VALUE: 34

## 2018-06-11 PROBLEM — L97.922 ULCER OF LEFT LOWER LEG, WITH FAT LAYER EXPOSED (HCC): Status: ACTIVE | Noted: 2018-01-01

## 2018-07-11 PROBLEM — I87.2 VENOUS STASIS ULCER OF LEFT CALF WITH FAT LAYER EXPOSED WITHOUT VARICOSE VEINS (HCC): Chronic | Status: ACTIVE | Noted: 2018-01-01

## 2018-07-11 PROBLEM — L97.222 VENOUS STASIS ULCER OF LEFT CALF WITH FAT LAYER EXPOSED WITHOUT VARICOSE VEINS (HCC): Chronic | Status: ACTIVE | Noted: 2018-01-01

## 2018-07-11 NOTE — PROGRESS NOTES
Wound Healing Center Followup Visit Note    Referring Physician : Vidhya Shaw MD  [address] RECORD NUMBER:  [id number]  AGE: 67 y.o. GENDER: male  : 1945  EPISODE DATE:  2018    Subjective:     Chief Complaint   Patient presents with    Wound Check     Left leg      HISTORY of PRESENT ILLNESS HPI   Anaya Ibrahim is a 67 y.o. male who presents today for wound/ulcer evaluation. History of Wound Context:  Patient had trauma to his left leg after hitting it against the bed rails 2018. He has been doing local wound care at home. He was first seen in the wound healing center by Dr. Elen Broderick on 2018. At that time the wounds were not improving. He has a history of diabetes, and does smoke. He has had issues similar to this in the past which have healed. I had seen the patient in the past 3 2017 regarding temporary dialysis access.     18  · ABIs and PVRs Reviewed ABIs non compressible  R TBI 0.71, L TBI 0.68  · Mild bilateral tibial disease  · Based off arterial study patient should have adequate flow to heal  · Will start patient and compression wrap  · Patient will follow up long-term with Dr. Ayden Maddox from podiatry as he has seen him before  · Patient completed oral antibiotic course earlier this week    Wound/Ulcer Pain Timing/Severity: intermittent  Quality of pain: dull, aching, throbbing  Severity:  5 / 10   Modifying Factors: Pain worsens with presssure, debridement  Associated Signs/Symptoms: drainage and pain    Ulcer Identification:  Ulcer Type: venous, diabetic and traumatic  Contributing Factors: edema, venous stasis, diabetes and poor glucose control    Diabetic/Pressure/Non Pressure Ulcers only:  Ulcer: Diabetic ulcer, fat layer exposed    Wound: Laceration        PAST MEDICAL HISTORY      Diagnosis Date    Diabetes mellitus ([facility]     Ulcer of left lower leg, with fat layer exposed ([facility] 2018    Venous stasis ulcer of left calf with fat layer

## 2018-07-16 NOTE — PLAN OF CARE
Problem: Wound:  Goal: Will show signs of wound healing; wound closure and no evidence of infection  Will show signs of wound healing; wound closure and no evidence of infection   Outcome: Ongoing      Problem: Weight control:  Goal: Ability to maintain an optimal weight for height and age will be supported  Ability to maintain an optimal weight for height and age will be supported   Outcome: Ongoing      Problem: Blood Glucose:  Goal: Ability to maintain appropriate glucose levels will improve  Ability to maintain appropriate glucose levels will improve   Outcome: Ongoing

## 2018-07-16 NOTE — PROGRESS NOTES
base.    Performed by: Lefty Mario DPM    Consent obtained:  Yes    Time out taken:  Yes    Pain Control: Anesthetic  Anesthetic: 2% Lidocaine Gel Topical     Debridement:Excisional Debridement    Using curette the wound(s)/ulcer(s) was/were sharply debrided down through and including the removal of subcutaneous tissue. Devitalized Tissue Debrided:  fibrin, biofilm and slough to stimulate bleeding to promote healing, post debridement good bleeding base and wound edges noted    Pre Debridement Measurements:  Are located in the Wound/Ulcer Documentation Flow Sheet    Wound/Ulcer #: 1    Post Debridement Measurements:  Wound/Ulcer Descriptions are Pre Debridement except measurements:    Negative Pressure Wound Therapy Abdomen (Active)   Number of days: 485       Wound 03/14/17 Surgical dehiscence Abdomen Right; Lower (Active)   Number of days: 488       Wound 06/11/18 Other (Comment) Leg Left;Posterior; Lower #1 acq: 5-1-18 diabetic ulcer (Active)   Wound Image   7/11/2018  9:31 AM   Wound Diabetic Portillo 1 6/11/2018  1:53 PM   Dressing Status Clean;Dry; Intact 6/25/2018  3:13 PM   Dressing Changed Changed/New 6/25/2018  3:13 PM   Dressing/Treatment Silver dressing 6/25/2018  3:13 PM   Wound Cleansed Rinsed/Irrigated with saline 6/25/2018  3:13 PM   Wound Length (cm) 5.4 cm 7/16/2018  3:30 PM   Wound Width (cm) 2.6 cm 7/16/2018  3:30 PM   Wound Depth (cm)  0.3 7/16/2018  3:30 PM   Calculated Wound Size (cm^2) (l*w) 14.04 cm^2 7/16/2018  3:30 PM   Change in Wound Size % (l*w) 63.29 7/16/2018  3:30 PM   Wound Assessment Pink;Yellow 7/16/2018  3:10 PM   Drainage Amount Moderate 7/16/2018  3:10 PM   Drainage Description Yellow 7/16/2018  3:10 PM   Odor None 7/16/2018  3:10 PM   Vannessa-wound Assessment Dry; Intact 7/16/2018  3:10 PM   Time out Yes 7/16/2018  3:30 PM   Procedural Pain 4 7/11/2018  9:41 AM   Post procedural Pain 0 7/11/2018  9:41 AM   Number of days: 35       Incision 04/11/17 Abdomen Mid (Active) Number of days: 461     Percent of Wound/Ulcer Debrided: 100%    Total Surface Area Debrided:  20 sq cm     Estimated Blood Loss:  Minimal  Hemostasis Achieved:  by pressure    Procedural Pain:  2  / 10   Post Procedural Pain:  0 / 10     Response to treatment:  Well tolerated by patient. Plan:   Treatment Note please see attached Discharge Instructions    Written patient dismissal instructions given to patient and signed by patient or POA. Discharge Instructions       Visit Discharge/Physician Orders     Discharge condition: Stable     Assessment of pain at discharge:none     Anesthetic used: 2% lidocaine gel     Discharge to: Home     Left via:public transportation     Accompanied by:self     ECF/HHA: n/a     Dressing Orders:LEFT LOWER LEG ULCER-Cleanse with normal saline, apply aquacel , dry dressing and coban 2. Change weekly.      Elevate legs above level of heart as much as possible.      Treatment Orders:Eat a diet high in protein and vitamin C. Take a multiple vitamin daily unless contraindicated. -follow a diabetic diet as explained     Keep all pressure off of site     [facility] followup visit 1 week_Dr. Rodriguez Letters- pt to stay with Dr. Richards__________________________  (Please note your next appointment above and if you are unable to keep, kindly give a 24 hour notice. Thank you.)     Physician signature:__________________________        If you experience any of the following, please call the [facility] during business hours:     * Increase in Pain  * Temperature over 101  * Increase in drainage from your wound  * Drainage with a foul odor  * Bleeding  * Increase in swelling  * Need for compression bandage changes due to slippage, breakthrough drainage.     If you need medical attention outside of the business hours of the [facility] [employer] [facility] please contact your PCP or go to the nearest emergency room.         Electronically signed by Carlos Dawson DPM on 7/16/2018 at 3:32 PM

## 2018-07-23 NOTE — PROGRESS NOTES
Lidocaine Gel Topical     Debridement:Excisional Debridement    Using curette the wound(s)/ulcer(s) was/were sharply debrided down through and including the removal of subcutaneous tissue. Devitalized Tissue Debrided:  fibrin, biofilm and slough to stimulate bleeding to promote healing, post debridement good bleeding base and wound edges noted    Pre Debridement Measurements:  Are located in the Wound/Ulcer Documentation Flow Sheet    Wound/Ulcer #: 1    Post Debridement Measurements:  Wound/Ulcer Descriptions are Pre Debridement except measurements:    Negative Pressure Wound Therapy Abdomen (Active)   Number of days: 492       Wound 03/14/17 Surgical dehiscence Abdomen Right; Lower (Active)   Number of days: 495       Wound 06/11/18 Other (Comment) Leg Left;Posterior; Lower #1 acq: 5-1-18 diabetic ulcer (Active)   Wound Image   7/11/2018  9:31 AM   Wound Diabetic Portillo 1 6/11/2018  1:53 PM   Dressing Status Clean;Dry; Intact 6/25/2018  3:13 PM   Dressing Changed Changed/New 7/16/2018  3:46 PM   Dressing/Treatment Alginate 7/16/2018  3:46 PM   Wound Cleansed Rinsed/Irrigated with saline 7/16/2018  3:46 PM   Wound Length (cm) 6.2 cm 7/23/2018  2:54 PM   Wound Width (cm) 3.5 cm 7/23/2018  2:54 PM   Wound Depth (cm)  0.3 7/23/2018  2:54 PM   Calculated Wound Size (cm^2) (l*w) 21.7 cm^2 7/23/2018  2:54 PM   Change in Wound Size % (l*w) 43.27 7/23/2018  2:54 PM   Wound Assessment Pink;Slough; Yellow 7/23/2018  2:30 PM   Drainage Amount Moderate 7/23/2018  2:30 PM   Drainage Description Green;Tan;Yellow 7/23/2018  2:30 PM   Odor Mild 7/23/2018  2:30 PM   Vannessa-wound Assessment Maceration 7/23/2018  2:30 PM   Time out Yes 7/23/2018  2:54 PM   Procedural Pain 4 7/11/2018  9:41 AM   Post procedural Pain 0 7/11/2018  9:41 AM   Number of days: 42       Incision 04/11/17 Abdomen Mid (Active)   Number of days: 468     Percent of Wound/Ulcer Debrided: 100%    Total Surface Area Debrided:  20 sq cm     Estimated Blood Loss: Minimal  Hemostasis Achieved:  by pressure    Procedural Pain:  2  / 10   Post Procedural Pain:  0 / 10     Response to treatment:  Well tolerated by patient., With complaints of pain. Plan:   Treatment Note please see attached Discharge Instructions    Written patient dismissal instructions given to patient and signed by patient or POA. Discharge Instructions       Visit Discharge/Physician Orders     Discharge condition: Stable     Assessment of pain at discharge:none     Anesthetic used: 2% lidocaine gel     Discharge to: Home     Left via:public transportation     Accompanied by:self     ECF/HHA: n/a     Dressing Orders:LEFT LOWER LEG ULCER-Cleanse with normal saline, apply aquacel , dry dressing and coban 2. Change weekly.      Elevate legs above level of heart as much as possible.      Treatment Orders:Eat a diet high in protein and vitamin C. Take a multiple vitamin daily unless contraindicated. -follow a diabetic diet as explained     Keep all pressure off of site     [facility] followup visit 1 week_Dr. Richards__________________________  (Please note your next appointment above and if you are unable to keep, kindly give a 24 hour notice. Thank you.)     Physician signature:__________________________        If you experience any of the following, please call the [address] during business hours:     * Increase in Pain  * Temperature over 101  * Increase in drainage from your wound  * Drainage with a foul odor  * Bleeding  * Increase in swelling  * Need for compression bandage changes due to slippage, breakthrough drainage.     If you need medical attention outside of the business hours of the [facility] please contact your PCP or go to the nearest emergency room.         Electronically signed by Fatimah Puenet DPM on 7/23/2018 at 2:57 PM

## 2018-07-23 NOTE — PLAN OF CARE
Problem: Wound:  Goal: Will show signs of wound healing; wound closure and no evidence of infection  Will show signs of wound healing; wound closure and no evidence of infection   Outcome: Ongoing      Problem: Blood Glucose:  Goal: Ability to maintain appropriate glucose levels will improve  Ability to maintain appropriate glucose levels will improve   Outcome: Ongoing      Problem: Weight control:  Goal: Ability to maintain an optimal weight for height and age will be supported  Ability to maintain an optimal weight for height and age will be supported   Outcome: Ongoing

## 2018-08-06 NOTE — PROGRESS NOTES
Wound Healing Center Followup Visit Note    Referring Physician : Sabrina Hammond MD  [address] RECORD NUMBER:  [id number]  AGE: 67 y.o. GENDER: male  : 1945  EPISODE DATE:  2018    Subjective:     Chief Complaint   Patient presents with    Wound Check     L foot      HISTORY of PRESENT ILLNESS HPI   Flora Russell is a 67 y.o. male who presents today for wound/ulcer evaluation.    History of Wound Context:       Wound/Ulcer Pain Timing/Severity: intermittent  Quality of pain: aching  Severity:  2 / 10   Modifying Factors: Pain worsens with walking  Associated Signs/Symptoms: none    Ulcer Identification:  Ulcer Type: venous  Contributing Factors: edema and venous stasis    Diabetic/Pressure/Non Pressure Ulcers only:  Ulcer: Non-Pressure ulcer, fat layer exposed    Wound: N/A        PAST MEDICAL HISTORY      Diagnosis Date    Diabetes mellitus ([address]     Ulcer of left lower leg, with fat layer exposed ([address] 2018    Venous stasis ulcer of left calf with fat layer exposed without varicose veins ([facility]) 2018     Past Surgical History:   Procedure Laterality Date    ADENOIDECTOMY      COLONOSCOPY  2005    valles    COLONOSCOPY  2017    DR VALLES    TONSILLECTOMY       Family History   Problem Relation Age of Onset    Stroke Mother     Other Father      Social History   Substance Use Topics    Smoking status: Light Tobacco Smoker     Types: Cigars, Pipe    Smokeless tobacco: Never Used    Alcohol use No      Comment: SOCIALLY     No Known Allergies  Current Outpatient Prescriptions on File Prior to Encounter   Medication Sig Dispense Refill    Cholecalciferol (VITAMIN D3) 76999 units CAPS Take 1 capsule by mouth once a week 4 capsule 5    colchicine (COLCRYS) 0.6 MG tablet Take 1 tablet by mouth 2 times daily 180 tablet 1    allopurinol (ZYLOPRIM) 300 MG tablet Take 1 tablet by mouth daily 90 tablet 1    apixaban (ELIQUIS) 5 MG TABS tablet Take 1 tablet by mouth 2 Procedural Pain:  2 / 10     Response to treatment:  Well tolerated by patient. Plan:   Treatment Note please see attached Discharge Instructions    Written patient dismissal instructions given to patient and signed by patient or POA. Discharge Instructions                   Visit Discharge/Physician Orders     Discharge condition: Stable     Assessment of pain at discharge:none     Anesthetic used: 2% lidocaine gel     Discharge to: Home     Left via:public transportation     Accompanied by:self     ECF/HHA: n/a     Dressing Orders:LEFT LOWER LEG ULCER-Cleanse with normal saline, apply aquacel , dry dressing and coban 2. Change weekly.      Elevate legs above level of heart as much as possible.      Treatment Orders:Eat a diet high in protein and vitamin C. Take a multiple vitamin daily unless contraindicated. -follow a diabetic diet as explained     Keep all pressure off of site     [address] [place] [address],[address] followup visit 1 week_Dr. Richards__________________________  (Please note your next appointment above and if you are unable to keep, kindly give a 24 hour notice. Thank you.)     Physician signature:__________________________        If you experience any of the following, please call the [address] during business hours:     * Increase in Pain  * Temperature over 101  * Increase in drainage from your wound  * Drainage with a foul odor  * Bleeding  * Increase in swelling  * Need for compression bandage changes due to slippage, breakthrough drainage.     If you need medical attention outside of the business hours of the [address] please contact your PCP or go to the nearest emergency room.              Electronically signed by Kvng Diaz DPM on 8/6/2018 at 2:58 PM

## 2018-08-13 NOTE — PROGRESS NOTES
Wound Healing Center Followup Visit Note    Referring Physician : Ladi Mcpherson MD  [address] RECORD NUMBER:  [id number]  AGE: 67 y.o. GENDER: male  : 1945  EPISODE DATE:  2018    Subjective:     Chief Complaint   Patient presents with    Wound Check     lt leg      HISTORY of PRESENT ILLNESS HPI   Norma Graham is a 67 y.o. male who presents today for wound/ulcer evaluation. History of Wound Context:  Follow up and debridement.      Wound/Ulcer Pain Timing/Severity: intermittent  Quality of pain: dull, aching  Severity:  2 / 10   Modifying Factors: Pain worsens with walking and Pain is relieved/improved with rest  Associated Signs/Symptoms: edema and drainage    Ulcer Identification:  Ulcer Type: venous  Contributing Factors: edema, venous stasis and lymphedema    Diabetic/Pressure/Non Pressure Ulcers only:  Ulcer: Non-Pressure ulcer, fat layer exposed    Wound: N/A        PAST MEDICAL HISTORY      Diagnosis Date    Diabetes mellitus ([address]     Ulcer of left lower leg, with fat layer exposed ([address] 2018    Venous stasis ulcer of left calf with fat layer exposed without varicose veins ([address] 2018     Past Surgical History:   Procedure Laterality Date    ADENOIDECTOMY      COLONOSCOPY  2005    valles    COLONOSCOPY  2017    DR VALLES    TONSILLECTOMY       Family History   Problem Relation Age of Onset    Stroke Mother     Other Father      Social History   Substance Use Topics    Smoking status: Light Tobacco Smoker     Types: Cigars, Pipe    Smokeless tobacco: Never Used    Alcohol use No      Comment: SOCIALLY     No Known Allergies  Current Outpatient Prescriptions on File Prior to Encounter   Medication Sig Dispense Refill    Cholecalciferol (VITAMIN D3) 50809 units CAPS Take 1 capsule by mouth once a week 4 capsule 5    colchicine (COLCRYS) 0.6 MG tablet Take 1 tablet by mouth 2 times daily 180 tablet 1    allopurinol (ZYLOPRIM) 300 MG tablet Take 1

## 2018-08-20 NOTE — PROGRESS NOTES
Minimal  Hemostasis Achieved:  by pressure    Procedural Pain:  2  / 10   Post Procedural Pain:  0 / 10     Response to treatment:  Well tolerated by patient. Plan:   Treatment Note please see attached Discharge Instructions    Written patient dismissal instructions given to patient and signed by patient or POA. Discharge Instructions         Visit Discharge/Physician Orders     Discharge condition: Stable     Assessment of pain at discharge:none     Anesthetic used: 2% lidocaine gel     Discharge to: Home     Left via:public transportation     Accompanied by:self     ECF/HHA: n/a     Dressing Orders:LEFT LOWER LEG ULCER-Cleanse with normal saline, apply aquacel , dry dressing and coban 2. Change weekly.        Elevate legs above level of heart as much as possible.      Treatment Orders:Eat a diet high in protein and vitamin C. Take a multiple vitamin daily unless contraindicated. -follow a diabetic diet as explained     Keep all pressure off of site     [facility]_Dr. Richards__________________________  (Please note your next appointment above and if you are unable to keep, kindly give a 24 hour notice.  Thank you.)     Physician signature:__________________________        If you experience any of the following, please call the [facility] [address] during business hours:     * Increase in Pain  * Temperature over 101  * Increase in drainage from your wound  * Drainage with a foul odor  * Bleeding  * Increase in swelling  * Need for compression bandage changes due to slippage, breakthrough drainage.     If you need medical attention outside of the business hours of the [facility] please contact your PCP or go to the nearest emergency room.                    Electronically signed by Sadie Noyola DPM on 8/20/2018 at 3:26 PM

## 2018-09-17 NOTE — PROGRESS NOTES
[facility] Followup Visit Note    Referring Physician : Heather Brice MD  [address] RECORD NUMBER:  [id number]  AGE: 67 y.o. GENDER: male  : 1945  EPISODE DATE:  2018    Subjective:     Chief Complaint   Patient presents with    Wound Check     patient here for treatment of left lower leg ulcer      HISTORY of PRESENT ILLNESS HPI   Ania Blair is a 67 y.o. male who presents today for wound/ulcer evaluation.    History of Wound Context:  Follow up and debridement     Wound/Ulcer Pain Timing/Severity: intermittent  Quality of pain: dull, aching  Severity:  3 / 10   Modifying Factors: Pain worsens with walking and Pain is relieved/improved with rest  Associated Signs/Symptoms: edema, erythema and pain    Ulcer Identification:  Ulcer Type: venous  Contributing Factors: edema, venous stasis, lymphedema and obesity    Diabetic/Pressure/Non Pressure Ulcers only:  Ulcer: Diabetic ulcer, fat layer exposed    Wound: N/A        PAST MEDICAL HISTORY      Diagnosis Date    Diabetes mellitus ([address]     Ulcer of left lower leg, with fat layer exposed ([address] 2018    Venous stasis ulcer of left calf with fat layer exposed without varicose veins ([address] 2018     Past Surgical History:   Procedure Laterality Date    ADENOIDECTOMY      COLONOSCOPY  2005    valles    COLONOSCOPY  2017    DR VALLES    TONSILLECTOMY       Family History   Problem Relation Age of Onset    Stroke Mother     Other Father      Social History   Substance Use Topics    Smoking status: Light Tobacco Smoker     Types: Cigars, Pipe    Smokeless tobacco: Never Used    Alcohol use No      Comment: SOCIALLY     No Known Allergies  Current Outpatient Prescriptions on File Prior to Encounter   Medication Sig Dispense Refill    allopurinol (ZYLOPRIM) 300 MG tablet TAKE ONE TABLET BY MOUTH EVERY DAY 90 tablet 0    warfarin (COUMADIN) 7.5 MG tablet Take 1 tablet by mouth daily 30 tablet 3    pantoprazole (PROTONIX) 40 MG tablet Take 1 tablet by mouth daily 30 tablet 0    warfarin (COUMADIN) 6 MG tablet Take 1 tablet by mouth daily 30 tablet 1    metFORMIN (GLUCOPHAGE) 500 MG tablet Take 1 tablet by mouth 2 times daily (with meals) 60 tablet 3    Cholecalciferol (VITAMIN D3) 78345 units CAPS Take 1 capsule by mouth once a week 4 capsule 5    colchicine (COLCRYS) 0.6 MG tablet Take 1 tablet by mouth 2 times daily 180 tablet 1    sodium bicarbonate 650 MG tablet Take 1 tablet by mouth 2 times daily 180 tablet 1    magnesium oxide (MAG-OX) 400 MG tablet Take 1 tablet by mouth 2 times daily 180 tablet 1    glucose blood VI test strips (ONE TOUCH TEST STRIPS) strip 1 each by In Vitro route 3 times daily Indications: DX:E11.9 As needed. 90 each 5    Alcohol Swabs 70 % PADS 1  each 0    Lancets (BD LANCET ULTRAFINE 80W) MISC 1 applicator by Does not apply route 3 times daily Indications: E11.9 15 each 0    Blood Glucose Monitoring Suppl (ONE TOUCH BASIC SYSTEM) W/DEVICE KIT 1 kit by Does not apply route daily as needed (high sugars) Indications: E11.9 1 kit 0     No current facility-administered medications on file prior to encounter.         REVIEW OF SYSTEMS See HPI    Objective:    /70   Pulse 88   Temp 97.8 °F (36.6 °C) (Oral)   Resp 16   Ht 5' 6\" (1.676 m)   Wt (!) 318 lb (144.2 kg)   BMI 51.33 kg/m²   Wt Readings from Last 3 Encounters:   09/17/18 (!) 318 lb (144.2 kg)   09/10/18 (!) 318 lb (144.2 kg)   09/07/18 (!) 317 lb 12.8 oz (144.2 kg)     PHYSICAL EXAM  CONSTITUTIONAL:   Awake, alert, cooperative   EYES:  lids and lashes normal   ENT: external ears and nose without lesions   NECK:  supple, symmetrical, trachea midline   SKIN:  Open wound Present    Assessment:     Problem List Items Addressed This Visit     Venous stasis ulcer of left calf with fat layer exposed without varicose veins (HCC) (Chronic)        Procedure Note  Indications:  Based on my examination of this patient's wound(s)/ulcer(s) today, debridement is required to promote healing and evaluate the wound base. Performed by: Shama aDlton DPM    Consent obtained:  Yes    Time out taken:  Yes    Pain Control: Anesthetic  Anesthetic: 2% Lidocaine Gel Topical     Debridement:Excisional Debridement    Using curette the wound(s)/ulcer(s) was/were sharply debrided down through and including the removal of subcutaneous tissue. Devitalized Tissue Debrided:  fibrin, biofilm and slough to stimulate bleeding to promote healing, post debridement good bleeding base and wound edges noted    Pre Debridement Measurements:  Are located in the Wound/Ulcer Documentation Flow Sheet    Wound/Ulcer #: 1    Post Debridement Measurements:  Wound/Ulcer Descriptions are Pre Debridement except measurements:    Negative Pressure Wound Therapy Abdomen (Active)   Number of days: 548       Wound 03/14/17 Surgical dehiscence Abdomen Right; Lower (Active)   Number of days: 551       Wound 06/11/18 Other (Comment) Leg Left;Posterior; Lower #1 acq: 5-1-18 diabetic ulcer (Active)   Wound Image   8/6/2018  1:51 PM   Wound Diabetic Portillo 1 6/11/2018  1:53 PM   Dressing Status Clean; Intact;Dry 9/4/2018 12:07 PM   Dressing Changed Changed/New 9/4/2018 12:07 PM   Dressing/Treatment Alginate 9/4/2018 12:07 PM   Wound Cleansed Rinsed/Irrigated with saline 9/4/2018 12:07 PM   Wound Length (cm) 1.7 cm 9/17/2018  2:57 PM   Wound Width (cm) 0.9 cm 9/17/2018  2:57 PM   Wound Depth (cm)  0.2 9/17/2018  2:57 PM   Calculated Wound Size (cm^2) (l*w) 1.53 cm^2 9/17/2018  2:57 PM   Change in Wound Size % (l*w) 96 9/17/2018  2:57 PM   Wound Assessment Red 9/17/2018  2:44 PM   Drainage Amount Moderate 9/17/2018  2:44 PM   Drainage Description Serosanguinous; Sanguinous 9/17/2018  2:44 PM   Odor None 9/17/2018  2:44 PM   Vannessa-wound Assessment Pink;Dry; Intact 9/17/2018  2:44 PM   Time out Yes 9/17/2018  2:57 PM   Procedural Pain 4 7/11/2018  9:41 AM   Post procedural Pain 0 7/11/2018  9:41 AM   Number of days: 98       Incision 04/11/17 Abdomen Mid (Active)   Number of days: 524     Percent of Wound/Ulcer Debrided: 100%    Total Surface Area Debrided:  20 sq cm     Estimated Blood Loss:  Minimal  Hemostasis Achieved:  by pressure    Procedural Pain:  1  / 10   Post Procedural Pain:  1 / 10     Response to treatment:  Well tolerated by patient. Plan:   Treatment Note please see attached Discharge Instructions    Written patient dismissal instructions given to patient and signed by patient or POA. Discharge Instructions         Visit Discharge/Physician Orders     Discharge condition: Stable     Assessment of pain at discharge:none     Anesthetic used: 2% lidocaine gel     Discharge to: Home     Left via:public transportation     Accompanied by:self     ECF/HHA: n/a     Dressing Orders:LEFT LOWER LEG ULCER-Cleanse with normal saline, apply aquacel , dry dressing and coban 2. Change weekly.         Elevate legs above level of heart as much as possible.      Treatment Orders:Eat a diet high in protein and vitamin C. Take a multiple vitamin daily unless contraindicated. -follow a diabetic diet as explained     Keep all pressure off of site     [facility] followup visit 2 week_Dr. Richards______1WEEK FOR NURSE VISIT____________________  (Please note your next appointment above and if you are unable to keep, kindly give a 24 hour notice.  Thank you.)     Physician signature:__________________________        If you experience any of the following, please call the [facility] during business hours:     * Increase in Pain  * Temperature over 101  * Increase in drainage from your wound  * Drainage with a foul odor  * Bleeding  * Increase in swelling  * Need for compression bandage changes due to slippage, breakthrough drainage.     If you need medical attention outside of the business hours of the [facility] please contact your PCP or go to the nearest emergency

## 2018-09-24 NOTE — PROGRESS NOTES
Wound Healing Center Followup Visit Note    Referring Physician : Ernst Anaya MD  [address] RECORD NUMBER:  [id number]  AGE: 67 y.o. GENDER: male  : 1945  EPISODE DATE:  2018    Subjective:     Chief Complaint   Patient presents with    Wound Check     left lower leg      HISTORY of PRESENT ILLNESS HPI   Rafi Sofia is a 67 y.o. male who presents today for wound/ulcer evaluation.    History of Wound Context:  Follow up with debridement     Wound/Ulcer Pain Timing/Severity: intermittent  Quality of pain: sharp, aching  Severity:  2 / 10   Modifying Factors: Pain worsens with walking and Pain is relieved/improved with rest  Associated Signs/Symptoms: edema    Ulcer Identification:  Ulcer Type: venous  Contributing Factors: edema, venous stasis and lymphedema    Diabetic/Pressure/Non Pressure Ulcers only:  Ulcer: Non-Pressure ulcer, fat layer exposed    Wound: N/A        PAST MEDICAL HISTORY      Diagnosis Date    Diabetes mellitus ([address]     Ulcer of left lower leg, with fat layer exposed ([address] 2018    Venous stasis ulcer of left calf with fat layer exposed without varicose veins (HCC) 2018     Past Surgical History:   Procedure Laterality Date    ADENOIDECTOMY      COLONOSCOPY  2005    ontiveros    COLONOSCOPY  2017    DR ONTIVEROS    TONSILLECTOMY       Family History   Problem Relation Age of Onset    Stroke Mother     Other Father      Social History   Substance Use Topics    Smoking status: Light Tobacco Smoker     Types: Cigars, Pipe    Smokeless tobacco: Never Used    Alcohol use No      Comment: SOCIALLY     No Known Allergies  Current Outpatient Prescriptions on File Prior to Encounter   Medication Sig Dispense Refill    allopurinol (ZYLOPRIM) 300 MG tablet TAKE ONE TABLET BY MOUTH EVERY DAY 90 tablet 0    pantoprazole (PROTONIX) 40 MG tablet Take 1 tablet by mouth daily 30 tablet 0    warfarin (COUMADIN) 6 MG tablet Take 1 tablet by mouth daily 30 tablet 1    metFORMIN (GLUCOPHAGE) 500 MG tablet Take 1 tablet by mouth 2 times daily (with meals) 60 tablet 3    Cholecalciferol (VITAMIN D3) 33752 units CAPS Take 1 capsule by mouth once a week 4 capsule 5    colchicine (COLCRYS) 0.6 MG tablet Take 1 tablet by mouth 2 times daily 180 tablet 1    sodium bicarbonate 650 MG tablet Take 1 tablet by mouth 2 times daily 180 tablet 1    magnesium oxide (MAG-OX) 400 MG tablet Take 1 tablet by mouth 2 times daily 180 tablet 1    glucose blood VI test strips (ONE TOUCH TEST STRIPS) strip 1 each by In Vitro route 3 times daily Indications: DX:E11.9 As needed. 90 each 5    Alcohol Swabs 70 % PADS 1  each 0    Lancets (BD LANCET ULTRAFINE 70B) MISC 1 applicator by Does not apply route 3 times daily Indications: E11.9 15 each 0    Blood Glucose Monitoring Suppl (ONE TOUCH BASIC SYSTEM) W/DEVICE KIT 1 kit by Does not apply route daily as needed (high sugars) Indications: E11.9 1 kit 0     No current facility-administered medications on file prior to encounter. REVIEW OF SYSTEMS See HPI    Objective:    BP (!) 142/78   Pulse 76   Temp 98 °F (36.7 °C) (Oral)   Resp 18   Ht 5' 6\" (1.676 m)   Wt (!) 318 lb (144.2 kg)   BMI 51.33 kg/m²   Wt Readings from Last 3 Encounters:   09/24/18 (!) 318 lb (144.2 kg)   09/17/18 (!) 318 lb (144.2 kg)   09/10/18 (!) 318 lb (144.2 kg)     PHYSICAL EXAM  CONSTITUTIONAL:   Awake, alert, cooperative   EYES:  lids and lashes normal   ENT: external ears and nose without lesions   NECK:  supple, symmetrical, trachea midline   SKIN:  Open wound Present    Assessment:     Problem List Items Addressed This Visit     None        Procedure Note  Indications:  Based on my examination of this patient's wound(s)/ulcer(s) today, debridement is required to promote healing and evaluate the wound base.     Performed by: Johanna Terrell DPM    Consent obtained:  Yes    Time out taken:  Yes    Pain Control: Anesthetic  Anesthetic: 2% Blood Loss:  Minimal  Hemostasis Achieved:  by pressure    Procedural Pain:  2  / 10   Post Procedural Pain:  0 / 10     Response to treatment:  Well tolerated by patient. Plan:   Treatment Note please see attached Discharge Instructions    Written patient dismissal instructions given to patient and signed by patient or POA. Discharge Instructions         Visit Discharge/Physician Orders     Discharge condition: Stable     Assessment of pain at discharge:none     Anesthetic used: 2% lidocaine gel     Discharge to: Home     Left via:public transportation     Accompanied by:self     ECF/HHA: n/a     Dressing Orders:LEFT LOWER LEG ULCER-Cleanse with normal saline, apply aquacel , dry dressing and coban 2. Change weekly.         Elevate legs above level of heart as much as possible.      Treatment Orders:Eat a diet high in protein and vitamin C. Take a multiple vitamin daily unless contraindicated. -follow a diabetic diet as explained     Keep all pressure off of site     C followup visit  1week____________________  (Please note your next appointment above and if you are unable to keep, kindly give a 24 hour notice.  Thank you.)     Physician signature:__________________________        If you experience any of the following, please call the [facility] during business hours:     * Increase in Pain  * Temperature over 101  * Increase in drainage from your wound  * Drainage with a foul odor  * Bleeding  * Increase in swelling  * Need for compression bandage changes due to slippage, breakthrough drainage.     If you need medical attention outside of the business hours of the [facility] please contact your PCP or go to the nearest emergency room.                                Electronically signed by Garrett Unger DPM on 9/24/2018 at 3:21 PM

## 2018-10-01 PROBLEM — E08.21 DIABETES MELLITUS DUE TO UNDERLYING CONDITION WITH DIABETIC NEPHROPATHY (HCC): Status: ACTIVE | Noted: 2018-01-01

## 2018-10-01 NOTE — PROGRESS NOTES
mouth daily 30 tablet 1    metFORMIN (GLUCOPHAGE) 500 MG tablet Take 1 tablet by mouth 2 times daily (with meals) 60 tablet 3    Cholecalciferol (VITAMIN D3) 77778 units CAPS Take 1 capsule by mouth once a week 4 capsule 5    colchicine (COLCRYS) 0.6 MG tablet Take 1 tablet by mouth 2 times daily 180 tablet 1    sodium bicarbonate 650 MG tablet Take 1 tablet by mouth 2 times daily 180 tablet 1    magnesium oxide (MAG-OX) 400 MG tablet Take 1 tablet by mouth 2 times daily 180 tablet 1    glucose blood VI test strips (ONE TOUCH TEST STRIPS) strip 1 each by In Vitro route 3 times daily Indications: DX:E11.9 As needed. 90 each 5    Alcohol Swabs 70 % PADS 1  each 0    Lancets (BD LANCET ULTRAFINE 15U) MISC 1 applicator by Does not apply route 3 times daily Indications: E11.9 15 each 0    Blood Glucose Monitoring Suppl (ONE TOUCH BASIC SYSTEM) W/DEVICE KIT 1 kit by Does not apply route daily as needed (high sugars) Indications: E11.9 1 kit 0     No current facility-administered medications on file prior to encounter. REVIEW OF SYSTEMS See HPI    Objective:    BP (!) 140/80   Pulse 88   Temp 98 °F (36.7 °C) (Oral)   Resp 20   Ht 5' 6\" (1.676 m)   Wt (!) 318 lb (144.2 kg)   BMI 51.33 kg/m²   Wt Readings from Last 3 Encounters:   10/01/18 (!) 318 lb (144.2 kg)   09/24/18 (!) 318 lb (144.2 kg)   09/17/18 (!) 318 lb (144.2 kg)     PHYSICAL EXAM  CONSTITUTIONAL:   Awake, alert, cooperative   EYES:  lids and lashes normal   ENT: external ears and nose without lesions   NECK:  supple, symmetrical, trachea midline   SKIN:  Open wound Present    Assessment:     Problem List Items Addressed This Visit     Venous stasis ulcer of left calf with fat layer exposed without varicose veins (HCC) (Chronic)        Procedure Note  Indications:  Based on my examination of this patient's wound(s)/ulcer(s) today, debridement is required to promote healing and evaluate the wound base.     Performed by: Keysha Reyes

## 2018-10-08 NOTE — PROGRESS NOTES
base.    Performed by: Maryann Storm DPM    Consent obtained:  Yes    Time out taken:  Yes    Pain Control: Anesthetic  Anesthetic: 2% Lidocaine Gel Topical     Debridement:Excisional Debridement    Using curette the wound(s)/ulcer(s) was/were sharply debrided down through and including the removal of subcutaneous tissue. Devitalized Tissue Debrided:  fibrin, biofilm and slough to stimulate bleeding to promote healing, post debridement good bleeding base and wound edges noted    Pre Debridement Measurements:  Are located in the Wound/Ulcer Documentation Flow Sheet    Wound/Ulcer #: 1    Post Debridement Measurements:  Wound/Ulcer Descriptions are Pre Debridement except measurements:    Negative Pressure Wound Therapy Abdomen (Active)   Number of days: 569       Wound 03/14/17 Surgical dehiscence Abdomen Right; Lower (Active)   Number of days: 572       Wound 06/11/18 Other (Comment) Leg Left;Posterior; Lower #1 acq: 5-1-18 diabetic ulcer (Active)   Wound Image   10/1/2018  1:48 PM   Wound Diabetic Portillo 1 6/11/2018  1:53 PM   Dressing Status Clean;Dry; Intact 10/1/2018  3:27 PM   Dressing Changed Changed/New 10/1/2018  3:27 PM   Dressing/Treatment Alginate;Dry dressing 10/1/2018  3:27 PM   Wound Cleansed Rinsed/Irrigated with saline 10/1/2018  3:27 PM   Wound Length (cm) 3 cm 10/8/2018  2:39 PM   Wound Width (cm) 1.5 cm 10/8/2018  2:39 PM   Wound Depth (cm)  0.2 10/8/2018  2:39 PM   Calculated Wound Size (cm^2) (l*w) 4.5 cm^2 10/8/2018  2:39 PM   Change in Wound Size % (l*w) 88.24 10/8/2018  2:39 PM   Wound Assessment Red;Pink 10/8/2018  2:02 PM   Drainage Amount Moderate 10/8/2018  2:02 PM   Drainage Description Serosanguinous 10/8/2018  2:02 PM   Odor None 10/8/2018  2:02 PM   Vannessa-wound Assessment Dry; Intact 10/8/2018  2:02 PM   Time out Yes 10/8/2018  2:39 PM   Procedural Pain 4 7/11/2018  9:41 AM   Post procedural Pain 0 7/11/2018  9:41 AM   Number of days: 119       Incision 04/11/17 Abdomen Mid

## 2018-10-15 NOTE — PROGRESS NOTES
Wound Healing Center Followup Visit Note    Referring Physician : Mojgan Orourke MD  [address] RECORD NUMBER:  [id number]  AGE: 75 y.o. GENDER: male  : 1945  EPISODE DATE:  10/15/2018    Subjective:     No chief complaint on file. HISTORY of PRESENT ILLNESS HPI   Dylan Gonsalez is a 75 y.o. male who presents today for wound/ulcer evaluation. Overall doing well. Tolerating dressing changes.   History of Wound Context:       Wound/Ulcer Pain Timing/Severity: intermittent  Quality of pain: burning  Severity:  2 / 10   Modifying Factors: None  Associated Signs/Symptoms: none    Ulcer Identification:  Ulcer Type: venous  Contributing Factors: edema and venous stasis    Diabetic/Pressure/Non Pressure Ulcers only:  Ulcer: Non-Pressure ulcer, fat layer exposed    Wound: N/A        PAST MEDICAL HISTORY      Diagnosis Date    Diabetes mellitus ([address]     Ulcer of left lower leg, with fat layer exposed ([address] 2018    Venous stasis ulcer of left calf with fat layer exposed without varicose veins (HCC) 2018     Past Surgical History:   Procedure Laterality Date    ADENOIDECTOMY      COLONOSCOPY  2005    ontiveros    COLONOSCOPY  2017    DR ONTIVEROS    TONSILLECTOMY       Family History   Problem Relation Age of Onset    Stroke Mother     Other Father      Social History   Substance Use Topics    Smoking status: Light Tobacco Smoker     Types: Cigars, Pipe    Smokeless tobacco: Never Used    Alcohol use No      Comment: SOCIALLY     No Known Allergies  Current Outpatient Prescriptions on File Prior to Encounter   Medication Sig Dispense Refill    warfarin (COUMADIN) 7.5 MG tablet Take 7.5 mg by mouth daily      pantoprazole (PROTONIX) 40 MG tablet TAKE ONE TABLET BY MOUTH DAILY 30 tablet 0    allopurinol (ZYLOPRIM) 300 MG tablet TAKE ONE TABLET BY MOUTH EVERY DAY 90 tablet 0    metFORMIN (GLUCOPHAGE) 500 MG tablet Take 1 tablet by mouth 2 times daily (with meals) 60 tablet 3 the wound base. Performed by: Jer Colon DPM    Consent obtained:  Yes    Time out taken:  Yes    Pain Control: Anesthetic  Anesthetic: 2% Lidocaine Gel Topical     Debridement:Excisional Debridement    Using curette the wound(s)/ulcer(s) was/were sharply debrided down through and including the removal of subcutaneous tissue. Devitalized Tissue Debrided:  fibrin, biofilm and slough to stimulate bleeding to promote healing, post debridement good bleeding base and wound edges noted    Pre Debridement Measurements:  Are located in the Wound/Ulcer Documentation Flow Sheet    Wound/Ulcer #: 1    Post Debridement Measurements:  Wound/Ulcer Descriptions are Pre Debridement except measurements:    Negative Pressure Wound Therapy Abdomen (Active)   Number of days: 576       Wound 03/14/17 Surgical dehiscence Abdomen Right; Lower (Active)   Number of days: 579       Wound 06/11/18 Other (Comment) Leg Left;Posterior; Lower #1 acq: 5-1-18 diabetic ulcer (Active)   Wound Image   10/1/2018  1:48 PM   Wound Diabetic Portillo 1 6/11/2018  1:53 PM   Dressing Status Clean;Dry; Intact 10/8/2018  2:48 PM   Dressing Changed Changed/New 10/8/2018  2:48 PM   Dressing/Treatment Alginate;Dry dressing 10/8/2018  2:48 PM   Wound Cleansed Rinsed/Irrigated with saline 10/8/2018  2:48 PM   Wound Length (cm) 2.7 cm 10/15/2018  1:47 PM   Wound Width (cm) 1.6 cm 10/15/2018  1:47 PM   Wound Depth (cm)  0.1 10/15/2018  1:47 PM   Calculated Wound Size (cm^2) (l*w) 4.32 cm^2 10/15/2018  1:47 PM   Change in Wound Size % (l*w) 88.71 10/15/2018  1:47 PM   Wound Assessment Red;Pink 10/15/2018  1:15 PM   Drainage Amount Moderate 10/15/2018  1:15 PM   Drainage Description Serosanguinous 10/15/2018  1:15 PM   Odor None 10/15/2018  1:15 PM   Vannessa-wound Assessment Dry; Intact; Pink 10/15/2018  1:15 PM   Time out Yes 10/15/2018  1:47 PM   Procedural Pain 1 10/15/2018  1:47 PM   Post procedural Pain 0 10/15/2018  1:47 PM   Number of days: 125 Incision 04/11/17 Abdomen Mid (Active)   Number of days: 552     Percent of Wound/Ulcer Debrided: 100%    Total Surface Area Debrided:  2.7 sq cm     Estimated Blood Loss:  Minimal  Hemostasis Achieved:  by pressure    Procedural Pain:  0  / 10   Post Procedural Pain:  1 / 10     Response to treatment:  Well tolerated by patient. Plan:   Treatment Note please see attached Discharge Instructions    Written patient dismissal instructions given to patient and signed by patient or POA. Discharge Instructions         Visit Discharge/Physician Orders     Discharge condition: Stable     Assessment of pain at discharge:none     Anesthetic used: 2% lidocaine gel     Discharge to: Home     Left via:public transportation     Accompanied by:self     ECF/HHA: n/a     Dressing Orders:LEFT LOWER LEG ULCER-Cleanse with normal saline, apply aquacel , dry dressing and coban 2. Change weekly.         Elevate legs above level of heart as much as possible.      Treatment Orders:Eat a diet high in protein and vitamin C. Take a multiple vitamin daily unless contraindicated. -follow a diabetic diet as explained     Keep all pressure off of site     [facility] followup visit  1 week____________________  (Please note your next appointment above and if you are unable to keep, kindly give a 24 hour notice.  Thank you.)     Physician signature:__________________________        If you experience any of the following, please call the [facility] during business hours:     * Increase in Pain  * Temperature over 101  * Increase in drainage from your wound  * Drainage with a foul odor  * Bleeding  * Increase in swelling  * Need for compression bandage changes due to slippage, breakthrough drainage.     If you need medical attention outside of the business hours of the [facility] please contact your PCP or go to the nearest emergency room.                                         Electronically signed by Natalie Lewis DPM on

## 2018-10-22 NOTE — PROGRESS NOTES
TAKE ONE TABLET BY MOUTH EVERY DAY 90 tablet 0    metFORMIN (GLUCOPHAGE) 500 MG tablet Take 1 tablet by mouth 2 times daily (with meals) 60 tablet 3    Cholecalciferol (VITAMIN D3) 12607 units CAPS Take 1 capsule by mouth once a week 4 capsule 5    colchicine (COLCRYS) 0.6 MG tablet Take 1 tablet by mouth 2 times daily 180 tablet 1    sodium bicarbonate 650 MG tablet Take 1 tablet by mouth 2 times daily 180 tablet 1    magnesium oxide (MAG-OX) 400 MG tablet Take 1 tablet by mouth 2 times daily 180 tablet 1    glucose blood VI test strips (ONE TOUCH TEST STRIPS) strip 1 each by In Vitro route 3 times daily Indications: DX:E11.9 As needed. 90 each 5    Alcohol Swabs 70 % PADS 1  each 0    Lancets (BD LANCET ULTRAFINE 45O) MISC 1 applicator by Does not apply route 3 times daily Indications: E11.9 15 each 0    Blood Glucose Monitoring Suppl (ONE TOUCH BASIC SYSTEM) W/DEVICE KIT 1 kit by Does not apply route daily as needed (high sugars) Indications: E11.9 1 kit 0     No current facility-administered medications on file prior to encounter. REVIEW OF SYSTEMS See HPI    Objective:    /80   Pulse 88   Temp 98 °F (36.7 °C) (Oral)   Resp 16   Ht 5' 6\" (1.676 m)   Wt (!) 319 lb (144.7 kg)   BMI 51.49 kg/m²   Wt Readings from Last 3 Encounters:   10/22/18 (!) 319 lb (144.7 kg)   10/15/18 (!) 319 lb (144.7 kg)   10/09/18 (!) 319 lb (144.7 kg)     PHYSICAL EXAM  CONSTITUTIONAL:   Awake, alert, cooperative   EYES:  lids and lashes normal   ENT: external ears and nose without lesions   NECK:  supple, symmetrical, trachea midline   SKIN:  Open wound Present    Assessment:     Problem List Items Addressed This Visit     Venous stasis ulcer of left calf with fat layer exposed without varicose veins (HCC) (Chronic)        Procedure Note  Indications:  Based on my examination of this patient's wound(s)/ulcer(s) today, debridement is required to promote healing and evaluate the wound base.     Performed

## 2018-10-29 NOTE — PROGRESS NOTES
[facility] Followup Visit Note    Referring Physician : Tony Patrick MD  [address] RECORD NUMBER:  [id number]  AGE: 75 y.o. GENDER: male  : 1945  EPISODE DATE:  10/29/2018    Subjective:     Chief Complaint   Patient presents with    Wound Check     left lower leg      HISTORY of PRESENT ILLNESS HPI   Cristal Brito is a 75 y.o. male who presents today for wound/ulcer evaluation.    History of Wound Context:  Follow up and debridement     Wound/Ulcer Pain Timing/Severity: intermittent  Quality of pain: sharp, aching  Severity:  2 / 10   Modifying Factors: Pain worsens with walking and Pain is relieved/improved with rest  Associated Signs/Symptoms: edema and pain    Ulcer Identification:  Ulcer Type: venous  Contributing Factors: edema, venous stasis and lymphedema    Diabetic/Pressure/Non Pressure Ulcers only:  Ulcer: Non-Pressure ulcer, fat layer exposed    Wound: N/A        PAST MEDICAL HISTORY      Diagnosis Date    Diabetes mellitus ([address]     Ulcer of left lower leg, with fat layer exposed ([address] 2018    Venous stasis ulcer of left calf with fat layer exposed without varicose veins ([address] 2018     Past Surgical History:   Procedure Laterality Date    ADENOIDECTOMY      COLONOSCOPY  2005    valles    COLONOSCOPY  2017    DR VALLES    TONSILLECTOMY       Family History   Problem Relation Age of Onset    Stroke Mother     Other Father      Social History   Substance Use Topics    Smoking status: Light Tobacco Smoker     Types: Cigars, Pipe    Smokeless tobacco: Never Used    Alcohol use No      Comment: SOCIALLY     No Known Allergies  Current Outpatient Prescriptions on File Prior to Encounter   Medication Sig Dispense Refill    warfarin (COUMADIN) 7.5 MG tablet Take 7.5 mg by mouth daily      allopurinol (ZYLOPRIM) 300 MG tablet TAKE ONE TABLET BY MOUTH EVERY DAY 90 tablet 0    metFORMIN (GLUCOPHAGE) 500 MG tablet Take 1 tablet by mouth 2 times daily (with meals) 60 tablet 3    Cholecalciferol (VITAMIN D3) 75285 units CAPS Take 1 capsule by mouth once a week 4 capsule 5    colchicine (COLCRYS) 0.6 MG tablet Take 1 tablet by mouth 2 times daily 180 tablet 1    sodium bicarbonate 650 MG tablet Take 1 tablet by mouth 2 times daily 180 tablet 1    magnesium oxide (MAG-OX) 400 MG tablet Take 1 tablet by mouth 2 times daily 180 tablet 1    glucose blood VI test strips (ONE TOUCH TEST STRIPS) strip 1 each by In Vitro route 3 times daily Indications: DX:E11.9 As needed. 90 each 5    Alcohol Swabs 70 % PADS 1  each 0    Lancets (BD LANCET ULTRAFINE 21W) MISC 1 applicator by Does not apply route 3 times daily Indications: E11.9 15 each 0    Blood Glucose Monitoring Suppl (ONE TOUCH BASIC SYSTEM) W/DEVICE KIT 1 kit by Does not apply route daily as needed (high sugars) Indications: E11.9 1 kit 0     No current facility-administered medications on file prior to encounter. REVIEW OF SYSTEMS See HPI    Objective:    Pulse 78   Temp 98.9 °F (37.2 °C) (Oral)   Resp 18   Ht 5' 6\" (1.676 m)   Wt (!) 319 lb (144.7 kg)   BMI 51.49 kg/m²   Wt Readings from Last 3 Encounters:   10/29/18 (!) 319 lb (144.7 kg)   10/22/18 (!) 319 lb (144.7 kg)   10/15/18 (!) 319 lb (144.7 kg)     PHYSICAL EXAM  CONSTITUTIONAL:   Awake, alert, cooperative   EYES:  lids and lashes normal   ENT: external ears and nose without lesions   NECK:  supple, symmetrical, trachea midline   SKIN:  Open wound Present    Assessment:     Problem List Items Addressed This Visit     Venous stasis ulcer of left calf with fat layer exposed without varicose veins (HCC) (Chronic)        Procedure Note  Indications:  Based on my examination of this patient's wound(s)/ulcer(s) today, debridement is required to promote healing and evaluate the wound base.     Performed by: Jenny Blair DPM    Consent obtained:  Yes    Time out taken:  Yes    Pain Control: Anesthetic  Anesthetic: 2% Lidocaine Gel Topical     Debridement:Excisional Debridement    Using curette the wound(s)/ulcer(s) was/were sharply debrided down through and including the removal of subcutaneous tissue. Devitalized Tissue Debrided:  fibrin, biofilm and slough to stimulate bleeding to promote healing, post debridement good bleeding base and wound edges noted    Pre Debridement Measurements:  Are located in the Wound/Ulcer Documentation Flow Sheet    Wound/Ulcer #: 1    Post Debridement Measurements:  Wound/Ulcer Descriptions are Pre Debridement except measurements:    Negative Pressure Wound Therapy Abdomen (Active)   Number of days: 590       Wound 03/14/17 Surgical dehiscence Abdomen Right; Lower (Active)   Number of days: 593       Wound 06/11/18 Other (Comment) Leg Left;Posterior; Lower #1 acq: 5-1-18 diabetic ulcer (Active)   Wound Image   10/29/2018  1:21 PM   Wound Diabetic [place] 1 6/11/2018  1:53 PM   Dressing Status Clean;Dry; Intact 10/29/2018  2:19 PM   Dressing Changed Changed/New 10/29/2018  2:19 PM   Dressing/Treatment Other (Comment) 10/29/2018  2:19 PM   Wound Cleansed Rinsed/Irrigated with saline 10/29/2018  2:19 PM   Wound Length (cm) 1 cm 10/29/2018  2:03 PM   Wound Width (cm) 0.6 cm 10/29/2018  2:03 PM   Wound Depth (cm)  0.3 10/29/2018  2:03 PM   Calculated Wound Size (cm^2) (l*w) 0.6 cm^2 10/29/2018  2:03 PM   Change in Wound Size % (l*w) 98.43 10/29/2018  2:03 PM   Wound Assessment Pink 10/29/2018  1:21 PM   Drainage Amount Scant 10/29/2018  1:21 PM   Drainage Description Serosanguinous 10/29/2018  1:21 PM   Odor None 10/29/2018  1:21 PM   Vannessa-wound Assessment Dry 10/29/2018  1:21 PM   Time out Yes 10/29/2018  2:03 PM   Procedural Pain 1 10/15/2018  1:47 PM   Post procedural Pain 0 10/15/2018  1:47 PM   Number of days: 140       Incision 04/11/17 Abdomen Mid (Active)   Number of days: 566     Percent of Wound/Ulcer Debrided: 100%    Total Surface Area Debrided:  20 sq cm     Estimated Blood Loss:

## 2018-11-05 NOTE — PROGRESS NOTES
Lidocaine Gel Topical     Debridement:Excisional Debridement    Using curette the wound(s)/ulcer(s) was/were sharply debrided down through and including the removal of subcutaneous tissue. Devitalized Tissue Debrided:  fibrin, biofilm and slough to stimulate bleeding to promote healing, post debridement good bleeding base and wound edges noted    Pre Debridement Measurements:  Are located in the Wound/Ulcer Documentation Flow Sheet    Wound/Ulcer #: 1    Post Debridement Measurements:  Wound/Ulcer Descriptions are Pre Debridement except measurements:    Negative Pressure Wound Therapy Abdomen (Active)   Number of days: 597       Wound 03/14/17 Surgical dehiscence Abdomen Right; Lower (Active)   Number of days: 600       Wound 11/05/18 Abrasion(s) Pretibial Left;Proximal #2 acquired 10-29-18 (Active)   Wound Image   11/5/2018  1:28 PM   Wound Length (cm) 4 cm 11/5/2018  1:28 PM   Wound Width (cm) 4.8 cm 11/5/2018  1:28 PM   Wound Depth (cm)  0.1 11/5/2018  1:28 PM   Calculated Wound Size (cm^2) (l*w) 19.2 cm^2 11/5/2018  1:28 PM   Wound Assessment Pink;Yellow 11/5/2018  1:28 PM   Drainage Amount Moderate 11/5/2018  1:28 PM   Drainage Description Yellow;Serous 11/5/2018  1:28 PM   Odor None 11/5/2018  1:28 PM   Vannessa-wound Assessment Intact 11/5/2018  1:28 PM   Time out Yes 11/5/2018  2:03 PM   Number of days: 0       Incision 04/11/17 Abdomen Mid (Active)   Number of days: 573     Percent of Wound/Ulcer Debrided: 100%    Total Surface Area Debrided:  20 sq cm     Estimated Blood Loss:  Minimal  Hemostasis Achieved:  by pressure    Procedural Pain:  2  / 10   Post Procedural Pain:  0 / 10     Response to treatment:  Well tolerated by patient. Plan:   Treatment Note please see attached Discharge Instructions    Written patient dismissal instructions given to patient and signed by patient or POA.          Discharge Instructions       Visit Discharge/Physician Orders     Discharge condition: Stable     Assessment

## 2018-11-09 PROBLEM — J96.01 ACUTE RESPIRATORY FAILURE WITH HYPOXEMIA (HCC): Status: ACTIVE | Noted: 2018-01-01

## 2018-11-09 NOTE — ED NOTES
Lab notified of CMP add on and they will run it on tube already in lab.      Milana Tna RN  11/09/18 [id number]

## 2018-11-09 NOTE — PLAN OF CARE
Problem: Falls - Risk of:  Goal: Will remain free from falls  Will remain free from falls   Outcome: Met This Shift    Goal: Absence of physical injury  Absence of physical injury   Outcome: Met This Shift      Problem: OXYGENATION/RESPIRATORY FUNCTION  Goal: Patient will maintain patent airway  Outcome: Met This Shift    Goal: Patient will achieve/maintain normal respiratory rate/effort  Respiratory rate and effort will be within normal limits for the patient  Outcome: Not Met This Shift      Problem: SKIN INTEGRITY  Goal: Skin integrity is maintained or improved  Outcome: Met This Shift      Problem: NUTRITION  Goal: Nutritional status is improving  Outcome: Not Met This Shift      Problem: Skin Integrity:  Goal: Will show no infection signs and symptoms  Will show no infection signs and symptoms  Outcome: Met This Shift    Goal: Absence of new skin breakdown  Absence of new skin breakdown  Outcome: Met This Shift      Comments: Plan of care reviewed with patient and family, as available.  Roger Dunn

## 2018-11-09 NOTE — CONSULTS
9/17/18  Yes Viraj Oconnell MD   metFORMIN (GLUCOPHAGE) 500 MG tablet Take 1 tablet by mouth 2 times daily (with meals) 8/16/18  Yes Viraj Oconnell MD   Cholecalciferol (VITAMIN D3) 07238 units CAPS Take 1 capsule by mouth once a week 6/1/18  Yes Viraj Oconnell MD   colchicine (COLCRYS) 0.6 MG tablet Take 1 tablet by mouth 2 times daily 4/17/18  Yes Viraj Oconnell MD   sodium bicarbonate 650 MG tablet Take 1 tablet by mouth 2 times daily 3/1/18  Yes Viraj Oconnell MD   magnesium oxide (MAG-OX) 400 MG tablet Take 1 tablet by mouth 2 times daily 3/1/18  Yes Viraj Oconnell MD   glucose blood VI test strips (ONE TOUCH TEST STRIPS) strip 1 each by In Vitro route 3 times daily Indications: DX:E11.9 As needed. 7/7/17  Yes Viraj Oconnell MD   Alcohol Swabs 70 % PADS 1 TID 5/26/17  Yes Viraj Oconnell MD   Lancets (BD LANCET ULTRAFINE 12C) MISC 1 applicator by Does not apply route 3 times daily Indications: E11.9 5/23/17  Yes Viraj Oconnell MD   Blood Glucose Monitoring Suppl ([address]) W/DEVICE KIT 1 kit by Does not apply route daily as needed (high sugars) Indications: E11.9 5/12/17  Yes Viraj Oconnell MD       Allergies:  Patient has no known allergies. Social History:   TOBACCO:   reports that he has been smoking Cigars and Pipe. He has been smoking about 0.25 packs per day. He has never used smokeless tobacco.  ETOH:   reports that he does not drink alcohol. OCCUPATION:     Family History:   Family History   Problem Relation Age of Onset    Stroke Mother     Other Father        REVIEW OF SYSTEMS:    · Constitutional: fever, no chills  · HEENT: No blurred vision, no ear problems, no sore throat, no rhinorrhea. · Respiratory: shortness of breath on exertion. No cough, no sputum production, no pleuritic chest pain,   · Cardiology: dyspnea on exertion, leg swelling No angina,, no paroxysmal nocturnal dyspnea, no orthopnea, no palpitation, .    · Gastroenterology: No dysphagia, no reflux; no abdominal Afib on Coumadin, rate controlled   INR 6   Hold Coumadin for now    Pulmonology   Acute respiratory failure, 2/2 CHF vs PNA  Hyperventilation, ABG looks metabolic acidosis, not hypoxia   Continue BiPAP   CXR not able to differentiate with PNA  WBC high, Procalc high   Breathing treatment: Duoneb, Perforomist, Pulmicort   Follow resp panel, panculture   Continue ceftriaxone     Nephrology   Pre-renal JOSE - 2/2 AECHF   Creatinine 3.6, baseline 1.6  Obtain urine electroyltes   Trending Creatinine   Lasix given   Follow BMP   Monitor I/O     HAGMA - 2/2 lactic acidosis  AG 28, lactic 5.4 HCO3 12, delta-delta 4   Follow lactic acidosis, BMP   Possible ivf if creatinine trending up     Lactic acidosis 2/2 metformin - DM II   Hold metformin for now   Start Insulin low s/s     Infection/ Hematology   Leukocytosis - 2/2 ? PNA   Follow WBC, panculture, procalc   CXR daily     DVT/GI prophylaxis: hold coumadin/Protonix   Disposition: ICU    Stevan Quinones MD, PGY-1   Attending physician: Dr. Pepper Norwood personally saw, examined and provided care for the patient. Radiographs, labs and medication list were reviewed by me independently. I spoke with bedside nursing, therapists and consultants. Critical care services and times documented are independent of procedures and multidisciplinary rounds with Residents. Additionally comprehensive, multidisciplinary rounds were conducted with the MICU team. The case was discussed in detail and plans for care were established. Review of Residents documentation was conducted and revisions were made as appropriate. I agree with the above documented exam, problem list and plan of care.     Acute respiratory failure  Sepsis  Acute Renal Failure   Will give trial of NS bolus   Hard to decide if he is volume depleted ,large IVC,alse decrease contractility   On metformin  Renal following   If urine output improve with IVF ,then will consider gentle hydration   Abx   Work up sepsis  BiPAP  Correct INR if any bleed  Need central line  Bedside echo done  .   Archana Vazquez

## 2018-11-09 NOTE — PROGRESS NOTES
11/09/18 0400   NIV Type   $NIV $Daily Charge   Equipment ID [id number]   NIV Started Yes   Equipment Type v60   Mode AVAPS   Mask Type Full face mask   Mask Size Large   Bonnet size Large   Settings/Measurements   Comfort Level Good   Using Accessory Muscles Yes   IPAP (AVAPS 500/6)   EPAP 6 cmH2O   Vt Ordered 500 mL   Resp 20   SpO2 100   FiO2  100 %   Vt Exhaled 560 mL   Mask Leak (lpm) 30 lpm   Skin Protection for O2 Device No  (WAS PLACED IN EMERGENCY WILL ADD AFTER)   Placed patient on AVAPS upon arrival gases drawn shortly after

## 2018-11-10 NOTE — PROGRESS NOTES
[facility]  Nephrology Attending Progress Note  Liss Fournier MD        SUBJECTIVE:     11/10: not wanting to wear bipap.  Hs cough and sob, started on ivf at 75 ml/hr      PROBLEM LIST:    Patient Active Problem List   Diagnosis    Exogenous obesity    Gout    Hyperlipemia    Hyperuricemia    Obstructive sleep apnea    HTN (hypertension)    B12 deficiency    Vitamin D deficiency    Morbid obesity due to excess calories (HCC)    Appendicitis    Acute metabolic encephalopathy    Ulcer of left lower leg, with fat layer exposed ([address]    Venous stasis ulcer of left calf with fat layer exposed without varicose veins (HCC)    Diabetes mellitus due to underlying condition with diabetic nephropathy ([address]    Acute respiratory failure with hypoxemia ([address]        PAST MEDICAL HISTORY:    Past Medical History:   Diagnosis Date    Diabetes mellitus ([address]     Ulcer of left lower leg, with fat layer exposed ([address] 6/11/2018    Venous stasis ulcer of left calf with fat layer exposed without varicose veins (HCC) 7/11/2018       DIET:    DIET CARB CONTROL;     PHYSICAL EXAM:     Patient Vitals for the past 24 hrs:   BP Temp Temp src Pulse Resp SpO2 Weight   11/10/18 0800 114/69 98.5 °F (36.9 °C) - 94 24 100 % -   11/10/18 0751 - - - - 30 100 % -   11/10/18 0700 111/73 - - 98 (!) 31 95 % -   11/10/18 0600 121/76 - - 95 24 96 % (!) 300 lb 1.6 oz (136.1 kg)   11/10/18 0500 130/77 - - 96 (!) 43 95 % -   11/10/18 0400 131/76 97.9 °F (36.6 °C) - 91 29 96 % -   11/10/18 0333 - - - - 30 99 % -   11/10/18 0300 119/69 - - 91 24 99 % -   11/10/18 0200 118/70 - - 93 17 100 % -   11/10/18 0100 - - - 91 16 99 % -   11/10/18 0000 114/61 97.9 °F (36.6 °C) Temporal 91 26 99 % -   11/09/18 2300 (!) 96/57 - - 92 17 99 % -   11/09/18 2200 106/70 - - 91 30 100 % -   11/09/18 2100 (!) 103/58 - - 93 19 100 % -   11/09/18 2000 105/65 97.6 °F (36.4 °C) Temporal 96 30 100 % -   11/09/18 1927 - - - - 26 100 % -   11/09/18 1900 - - - Labs      11/09/18   0500  11/10/18   0525   WBC  17.8*  18.7*   HGB  12.3*  11.1*   HCT  40.8  35.3*   MCV  98.6  94.1   PLT  149  122*     Recent Labs      11/09/18   0500  11/09/18   1835  11/10/18   0525   NA  138  139  141   K  5.4*  4.6  4.7   CL  98  101  105   CO2  12*  18*  14*   BUN  57*  65*  70*   CREATININE  3.6*  3.6*  3.4*   LABGLOM  20  20  22   GLUCOSE  54*  151*  152*   CALCIUM  9.6  9.2  8.6   ALT  70*   --   77*   AST  241*   --   137*   BILITOT  2.9*   --   2.2*   ALKPHOS  62   --   50       Lab Results   Component Value Date    LABALBU 2.7 (L) 11/10/2018    LABALBU 3.5 11/09/2018    LABALBU 3.9 09/07/2018     Lab Results   Component Value Date    TSH 3.550 05/25/2017     No results found for: PHART, PO2ART, XJT9VFF    Iron Studies:   Lab Results   Component Value Date    IRON 28 (L) 03/18/2017    TIBC 128 (L) 03/18/2017         IMPRESSION/RECOMMENDATIONS:      1. arf on ckd 3  Baseline cr 1.7-1.9  In setting of pneumonia  Sp lasix in er  urine lytes show fena <1%  No hydro on us, c/w ckd  Trial ivf  Hold glucophage  Cr starting to improve     2. pnemonia/leukocytosis  Started on azithro and rocephin  On bipap  Check  cxr to assess for concomitant chf     3. afib  Per cardiology     4. Hyperkalemia  In setting of arf  Resolved  Low k diet     5. Gap metabolic acidosis  In setting of renal failure  Continue oral hco3 as he was on as outpt          Terrence Deist.  Nikita Baptiste MD

## 2018-11-10 NOTE — PROGRESS NOTES
swelling + . normal range of motion, no joint swelling, deformity or tenderness  § Neurologic: awake alert speech normal   Lines     site day    Art line   None    TLC None    PICC None    Hemoaccess None    Oxygen:     nasal canula at 4L/min  Mechanical Ventilation:     ABG:   Lab Results   Component Value Date    PH 7.388 11/09/2018    PCO2 24.1 11/09/2018    PO2 196.2 11/09/2018    HCO3 14.2 11/09/2018    BE -9.0 11/09/2018    THB 12.7 11/09/2018    O2SAT 99.1 11/09/2018        Medications     Infusions: (Fluid, Sedation, Vasopressors)  IVF:    NaCl 0.9%:  500ml bolus   NaHCO3: D5+3 amp bicarb at rate  ml/h  Vasopressors   Levophed at rate 0 mcg/min; Dopamine at rate 0 mcg/kg/min  Sedation   Propofol at rate of 0; Ativan/Versed at rate of 0    Nutrition:   NG/OG tube TF type: Pulmocare/Nephro/Glucerna/Jevity        At rate:  0ml/h  ATB:   Antibiotics  Days   Ceftriaxone 1g  11/10              Skin issues:   Patient currently has   Urinary cath  Isolation  Restraints  DVT prophylaxis/ GI prophylaxis,    PT/OT  SNF/NH placement  Palliative care consult   Labs     CBC:   Lab Results   Component Value Date    WBC 18.7 11/10/2018    RBC 3.75 11/10/2018    HGB 11.1 11/10/2018    HCT 35.3 11/10/2018    MCV 94.1 11/10/2018    MCH 29.6 11/10/2018    MCHC 31.4 11/10/2018    RDW 16.7 11/10/2018     11/10/2018    MPV 12.5 11/10/2018     WBC:    Lab Results   Component Value Date    WBC 18.7 11/10/2018     CMP:    Lab Results   Component Value Date     11/10/2018    K 4.7 11/10/2018     11/10/2018    CO2 14 11/10/2018    BUN 70 11/10/2018    CREATININE 3.4 11/10/2018    GFRAA 22 11/10/2018    LABGLOM 22 11/10/2018    GLUCOSE 152 11/10/2018    PROT 5.9 11/10/2018    LABALBU 2.7 11/10/2018    CALCIUM 8.6 11/10/2018    BILITOT 2.2 11/10/2018    ALKPHOS 50 11/10/2018     11/10/2018    ALT 77 11/10/2018     BUN/Creatinine:    Lab Results   Component Value Date    BUN 70 11/10/2018    CREATININE 3.4    Pulmonology   Acute respiratory failure, 2/2 CHF vs PNA  Hyperventilation, ABG looks metabolic acidosis, not hypoxia   Continue BiPAP PRN   CXR not able to differentiate with PNA  WBC high, Procalc high   Breathing treatment: Duoneb, Perforomist, Pulmicort   Follow resp panel, panculture   Continue ceftriaxone ,Azithromycin      Nephrology   Pre-renal JOSE - 2/2 AECHF - improving   Creatinine 3.6, baseline 1.6. Now 3.4   Obtain urine electroyltes   We gave bolus NS 500ml. Check BMP in 4h, if creatinine improves, may need more fluid. F/u ECHO  Trending Creatinine   Lasix given   Follow BMP   Monitor I/O      HAGMA - 2/2 lactic acidosis - improving   AG 28, now 22, lactic now 3.0 (5.4) HCO3 12, delta-delta 4   Follow lactic acidosis, BMP   Possible ivf if creatinine trending up      Lactic acidosis 2/2 metformin - DM II   Hold metformin for now   Start Insulin low s/s      Infection/ Hematology   Leukocytosis - 2/2 ? PNA   Follow WBC, panculture, procalc   CXR daily      DVT/GI prophylaxis: hold coumadin/Protonix   Disposition: ICU     Yue Cobb MD, PGY-1   Attending physician: Dr. Lex Gresham personally saw, examined and provided care for the patient. Radiographs, labs and medication list were reviewed by me independently. I spoke with bedside nursing, therapists and consultants. Critical care services and times documented are independent of procedures and multidisciplinary rounds with Residents. Additionally comprehensive, multidisciplinary rounds were conducted with the MICU team. The case was discussed in detail and plans for care were established. Review of Residents documentation was conducted and revisions were made as appropriate. I agree with the above documented exam, problem list and plan of care.     Patient was doing better in the morning  and his require less oxygen and not on distress  His BMP still showing acidosis and Latic acid slightly worse depiste bolus of fluidi  Urine output decreased K in the 4.7  Bicarb 14   Culture pending   Sepsis with septic shock and pending culture,source most likely pneumonia ,Lung     Renal following for worsening renal failure will defer HD to them   Assess fluid ,and will continue gentle hydration   coagulopathy with   Hb stable and  Will just continue Vitamin K   Later on patient coded and it seems sec to hyperkalemia and severe acidosis ,despite multiple attempts of CPR and treating acidosis   Discuss with Dr Gina Reyes reviewed with nursing staff, medical and surgical specialty care, primary care and the patient's family as available. \    Chart review/lab review/X-ray viewing/documentation and had long Conversation with patient/family re: prognosis, care options and any end of life issues:      Critical care time spent reviewing labs/films, examining patient, collaborating with other physicians more than 35  Minutes  excluding procedures . Arlin Thomas M.D.   11/10/2018  7:11 PM

## 2018-11-11 NOTE — PLAN OF CARE
Called to bedside to pronounce death after patient was noted to be in asystole at 18:57 11/10/2018. NO pupillary, corneal, doll's eye or gag reflex noted. NO heart sounds or pulse noted. NO Chest rise or Lung sounds noted. Time of death declared at 18:57.     Nabor Parrish MD PGY-3  11/10/2018  8:04 PM

## 2018-11-11 NOTE — PROGRESS NOTES
Spoke with [facility] representative Martine. She will fax instructions over and pt is currently a hold. Will call them back once pt is transported to the [facility], and if any next of kin is identified.

## 2018-11-11 NOTE — PROGRESS NOTES
Alec Marshall [phone] (Pt's friend) here took cell phone and is trying to contact son whom is in care home.

## 2018-11-12 ENCOUNTER — HOSPITAL ENCOUNTER (OUTPATIENT)
Dept: WOUND CARE | Age: 73
Discharge: HOME | End: 2018-11-12
Payer: MEDICARE

## 2018-11-12 LAB
CULTURE, RESPIRATORY: ABNORMAL
CULTURE, RESPIRATORY: ABNORMAL
EKG ATRIAL RATE: 111 BPM
EKG Q-T INTERVAL: 408 MS
EKG QRS DURATION: 180 MS
EKG QTC CALCULATION (BAZETT): 523 MS
EKG R AXIS: -98 DEGREES
EKG T AXIS: 22 DEGREES
EKG VENTRICULAR RATE: 99 BPM
ORGANISM: ABNORMAL
SMEAR, RESPIRATORY: ABNORMAL

## 2018-11-14 LAB
BLOOD CULTURE, ROUTINE: NORMAL
CULTURE, BLOOD 2: NORMAL

## 2018-11-18 LAB
EKG ATRIAL RATE: 104 BPM
EKG Q-T INTERVAL: 362 MS
EKG QRS DURATION: 128 MS
EKG QTC CALCULATION (BAZETT): 504 MS
EKG R AXIS: -65 DEGREES
EKG T AXIS: 114 DEGREES
EKG VENTRICULAR RATE: 117 BPM

## 2018-11-27 NOTE — PROGRESS NOTES
units CAPS Take 1 capsule by mouth once a week 4 capsule 5    colchicine (COLCRYS) 0.6 MG tablet Take 1 tablet by mouth 2 times daily 180 tablet 1    allopurinol (ZYLOPRIM) 300 MG tablet Take 1 tablet by mouth daily 90 tablet 1    sodium bicarbonate 650 MG tablet Take 1 tablet by mouth 2 times daily 180 tablet 1    magnesium oxide (MAG-OX) 400 MG tablet Take 1 tablet by mouth 2 times daily 180 tablet 1    glucose blood VI test strips (ONE TOUCH TEST STRIPS) strip 1 each by In Vitro route 3 times daily Indications: DX:E11.9 As needed. 90 each 5    Alcohol Swabs 70 % PADS 1  each 0    Lancets (BD LANCET ULTRAFINE 23D) MISC 1 applicator by Does not apply route 3 times daily Indications: E11.9 15 each 0    Blood Glucose Monitoring Suppl (ONE TOUCH BASIC SYSTEM) W/DEVICE KIT 1 kit by Does not apply route daily as needed (high sugars) Indications: E11.9 1 kit 0    warfarin (COUMADIN) 7.5 MG tablet Take 1 tablet by mouth daily 30 tablet 3    warfarin (COUMADIN) 6 MG tablet Take 1 tablet by mouth daily 30 tablet 1     No current facility-administered medications on file prior to encounter.         REVIEW OF SYSTEMS See HPI    Objective:    /84   Pulse 84   Temp 99.2 °F (37.3 °C) (Oral)   Resp 18   Ht 5' 6\" (1.676 m)   Wt (!) 318 lb (144.2 kg)   BMI 51.33 kg/m²   Wt Readings from Last 3 Encounters:   09/10/18 (!) 318 lb (144.2 kg)   09/07/18 (!) 317 lb 12.8 oz (144.2 kg)   09/04/18 286 lb (129.7 kg)     PHYSICAL EXAM  CONSTITUTIONAL:   Awake, alert, cooperative   EYES:  lids and lashes normal   ENT: external ears and nose without lesions   NECK:  supple, symmetrical, trachea midline   SKIN:  Open wound Present    Assessment:     Problem List Items Addressed This Visit     Venous stasis ulcer of left calf with fat layer exposed without varicose veins (HCC) (Chronic)        Procedure Note  Indications:  Based on my examination of this patient's wound(s)/ulcer(s) today, debridement is required to 04/11/17 Abdomen Mid (Active)   Number of days: 517     Percent of Wound/Ulcer Debrided: 100%    Total Surface Area Debrided:  20 sq cm     Estimated Blood Loss:  Minimal  Hemostasis Achieved:  by pressure    Procedural Pain:  2  / 10   Post Procedural Pain:  0 / 10     Response to treatment:  Well tolerated by patient. Plan:   Treatment Note please see attached Discharge Instructions    Written patient dismissal instructions given to patient and signed by patient or POA. Discharge Instructions         Visit Discharge/Physician Orders     Discharge condition: Stable     Assessment of pain at discharge:none     Anesthetic used: 2% lidocaine gel     Discharge to: Home     Left via:public transportation     Accompanied by:self     ECF/HHA: n/a     Dressing Orders:LEFT LOWER LEG ULCER-Cleanse with normal saline, apply aquacel , dry dressing and coban 2. Change weekly.         Elevate legs above level of heart as much as possible.      Treatment Orders:Eat a diet high in protein and vitamin C. Take a multiple vitamin daily unless contraindicated. -follow a diabetic diet as explained     Keep all pressure off of site     [facility] followup visit 2 week_Dr. Richards______1WEEK FOR NURSE VISIT____________________  (Please note your next appointment above and if you are unable to keep, kindly give a 24 hour notice.  Thank you.)     Physician signature:__________________________        If you experience any of the following, please call the [facility] during business hours:     * Increase in Pain  * Temperature over 101  * Increase in drainage from your wound  * Drainage with a foul odor  * Bleeding  * Increase in swelling  * Need for compression bandage changes due to slippage, breakthrough drainage.     If you need medical attention outside of the business hours of the [facility] please contact your PCP or go to the nearest emergency room.                          Electronically signed by Grace Chacon JOSE on 9/10/2018 at 4:07 PM 27-Nov-2018 12:45

## 2024-10-31 NOTE — PROGRESS NOTES
[facility] Followup Visit Note    Referring Physician : Silvia Napier MD  [address] RECORD NUMBER:  [id number]  AGE: 67 y.o. GENDER: male  : 1945  EPISODE DATE:  2018    Subjective:     Chief Complaint   Patient presents with    Wound Check     LEFT LEG WOUND      HISTORY of PRESENT ILLNESS HPI   Raine Juarez is a 67 y.o. male who presents today for wound/ulcer evaluation. History of Wound Context:  Follow up and debridement.      Wound/Ulcer Pain Timing/Severity: intermittent  Quality of pain: dull, aching, shooting  Severity:  3 / 10   Modifying Factors: Pain worsens with walking and Pain is relieved/improved with rest  Associated Signs/Symptoms: edema    Ulcer Identification:  Ulcer Type: venous  Contributing Factors: edema, venous stasis and lymphedema    Diabetic/Pressure/Non Pressure Ulcers only:  Ulcer: Non-Pressure ulcer, fat layer exposed    Wound: N/A        PAST MEDICAL HISTORY      Diagnosis Date    Diabetes mellitus ([address]     Ulcer of left lower leg, with fat layer exposed ([address] 2018    Venous stasis ulcer of left calf with fat layer exposed without varicose veins ([address] 2018     Past Surgical History:   Procedure Laterality Date    ADENOIDECTOMY      COLONOSCOPY  2005    ontiveros    COLONOSCOPY  2017    DR ONTIVEROS    TONSILLECTOMY       Family History   Problem Relation Age of Onset    Stroke Mother     Other Father      Social History   Substance Use Topics    Smoking status: Light Tobacco Smoker     Types: Cigars, Pipe    Smokeless tobacco: Never Used    Alcohol use No      Comment: SOCIALLY     No Known Allergies  Current Outpatient Prescriptions on File Prior to Encounter   Medication Sig Dispense Refill    Cholecalciferol (VITAMIN D3) 51615 units CAPS Take 1 capsule by mouth once a week 4 capsule 5    colchicine (COLCRYS) 0.6 MG tablet Take 1 tablet by mouth 2 times daily 180 tablet 1    allopurinol (ZYLOPRIM) 300 MG tablet Lidocaine Gel Topical     Debridement:Excisional Debridement    Using curette the wound(s)/ulcer(s) was/were sharply debrided down through and including the removal of subcutaneous tissue. Devitalized Tissue Debrided:  fibrin and biofilm to stimulate bleeding to promote healing, post debridement good bleeding base and wound edges noted    Pre Debridement Measurements:  Are located in the Wound/Ulcer Documentation Flow Sheet    Wound/Ulcer #: 1    Post Debridement Measurements:  Wound/Ulcer Descriptions are Pre Debridement except measurements:    Negative Pressure Wound Therapy Abdomen (Active)   Number of days: 499       Wound 03/14/17 Surgical dehiscence Abdomen Right; Lower (Active)   Number of days: 502       Wound 06/11/18 Other (Comment) Leg Left;Posterior; Lower #1 acq: 5-1-18 diabetic ulcer (Active)   Wound Image   7/11/2018  9:31 AM   Wound Diabetic [place] 1 6/11/2018  1:53 PM   Dressing Status Clean;Dry; Intact 7/23/2018  3:16 PM   Dressing Changed Changed/New 7/23/2018  3:16 PM   Dressing/Treatment Alginate 7/23/2018  3:16 PM   Wound Cleansed Rinsed/Irrigated with saline; Wound cleanser 7/23/2018  3:16 PM   Wound Length (cm) 6.7 cm 7/30/2018  2:34 PM   Wound Width (cm) 3.8 cm 7/30/2018  2:34 PM   Wound Depth (cm)  0.3 7/30/2018  2:34 PM   Calculated Wound Size (cm^2) (l*w) 25.46 cm^2 7/30/2018  2:34 PM   Change in Wound Size % (l*w) 33.44 7/30/2018  2:34 PM   Wound Assessment Pink;Slough; Yellow 7/30/2018  2:07 PM   Drainage Amount Moderate 7/30/2018  2:07 PM   Drainage Description Green;Tan;Yellow 7/30/2018  2:07 PM   Odor Mild 7/23/2018  2:30 PM   Vannessa-wound Assessment Intact 7/30/2018  2:07 PM   Time out Yes 7/30/2018  2:34 PM   Procedural Pain 4 7/11/2018  9:41 AM   Post procedural Pain 0 7/11/2018  9:41 AM   Number of days: 49       Incision 04/11/17 Abdomen Mid (Active)   Number of days: 475     Percent of Wound/Ulcer Debrided: 100%    Total Surface Area Debrided:  20 sq cm     Estimated Blood Loss:  Minimal  Hemostasis Achieved:  by pressure    Procedural Pain:  2  / 10   Post Procedural Pain:  0 / 10     Response to treatment:  Well tolerated by patient. Plan:   Treatment Note please see attached Discharge Instructions    Written patient dismissal instructions given to patient and signed by patient or POA. Discharge Instructions         Visit Discharge/Physician Orders     Discharge condition: Stable     Assessment of pain at discharge:none     Anesthetic used: 2% lidocaine gel     Discharge to: Home     Left via:public transportation     Accompanied by:self     ECF/HHA: n/a     Dressing Orders:LEFT LOWER LEG ULCER-Cleanse with normal saline, apply aquacel , dry dressing and coban 2. Change weekly.      Elevate legs above level of heart as much as possible.      Treatment Orders:Eat a diet high in protein and vitamin C. Take a multiple vitamin daily unless contraindicated. -follow a diabetic diet as explained     Keep all pressure off of site     [facility] followup visit 1 week_Dr. MCCULLOUGH__________________________  (Please note your next appointment above and if you are unable to keep, kindly give a 24 hour notice. Thank you.)     Physician signature:__________________________        If you experience any of the following, please call the [facility] during business hours:     * Increase in Pain  * Temperature over 101  * Increase in drainage from your wound  * Drainage with a foul odor  * Bleeding  * Increase in swelling  * Need for compression bandage changes due to slippage, breakthrough drainage.     If you need medical attention outside of the business hours of the [facility] please contact your PCP or go to the nearest emergency room.              Electronically signed by Shama Dalton DPM on 7/30/2018 at 2:37 PM no...